# Patient Record
Sex: MALE | Race: WHITE | NOT HISPANIC OR LATINO | Employment: FULL TIME | URBAN - METROPOLITAN AREA
[De-identification: names, ages, dates, MRNs, and addresses within clinical notes are randomized per-mention and may not be internally consistent; named-entity substitution may affect disease eponyms.]

---

## 2017-01-16 ENCOUNTER — OFFICE VISIT (OUTPATIENT)
Dept: FAMILY MEDICINE | Facility: CLINIC | Age: 27
End: 2017-01-16
Payer: COMMERCIAL

## 2017-01-16 VITALS
RESPIRATION RATE: 14 BRPM | SYSTOLIC BLOOD PRESSURE: 137 MMHG | HEART RATE: 70 BPM | TEMPERATURE: 98.2 F | DIASTOLIC BLOOD PRESSURE: 75 MMHG

## 2017-01-16 DIAGNOSIS — Z20.2 EXPOSURE TO GENITAL HERPES: Primary | ICD-10-CM

## 2017-01-16 PROCEDURE — 87529 HSV DNA AMP PROBE: CPT | Performed by: NURSE PRACTITIONER

## 2017-01-16 PROCEDURE — 99213 OFFICE O/P EST LOW 20 MIN: CPT | Performed by: NURSE PRACTITIONER

## 2017-01-16 RX ORDER — VALACYCLOVIR HYDROCHLORIDE 1 G/1
1000 TABLET, FILM COATED ORAL 2 TIMES DAILY
Qty: 20 TABLET | Refills: 0 | Status: SHIPPED | OUTPATIENT
Start: 2017-01-16 | End: 2017-01-30

## 2017-01-16 NOTE — NURSING NOTE
"Chief Complaint   Patient presents with     STD       Initial /75 mmHg  Pulse 70  Temp(Src) 98.2  F (36.8  C) (Tympanic)  Resp 14 Estimated body mass index is 27.87 kg/(m^2) as calculated from the following:    Height as of 3/3/16: 6' 4.5\" (1.943 m).    Weight as of 3/3/16: 232 lb (105.235 kg).  BP completed using cuff size: large  "

## 2017-01-16 NOTE — PATIENT INSTRUCTIONS
Take meds as directed.    Follow up with primary care provider in 1 week if needed.  Herpes: Caring for Sores  Good hygiene matters when you have herpes. Take care of your sores to speed healing. Neglected sores can lead to other infections.     Warm baths can ease itching and burning caused by sores.   To ease your symptoms    Take any medications as directed.    Take acetaminophen or ibuprofen for pain.    Take warm or cool baths to relieve itching of sores. And don t share towels when you have a sore.    Urinate in a tub of warm water to prevent burning. This works for women with genital herpes.    Don t wear tight clothes or nylon underwear. They can trap moisture, cause chafing, and prevent sores from healing.  To speed healing    Wash the sores with mild soap and water. And wash your hands after you touch a sore.    Dry the affected area completely.    Don t bandage sores. The dry air helps them heal.    Avoid ointments unless they are prescribed. They retain moisture and may cause other infections.    Don t pick at the sores. This can slow healing.    Don t touch your eyes when you have a sore. The virus may travel from your fingertips to your eyes.  Resources  [employer] STD Hotline  756.602.1353  www.ashastd.org  Centers for Disease Control and Prevention  813.902.2642  www.cdc.gov/std     5792-2828 [employer] [address] [place], [place] [id number]. All rights reserved. This information is not intended as a substitute for professional medical care. Always follow your healthcare professional's instructions.        Genital Herpes    Genital herpes is a common sexually transmitted disease (STD). It is caused by the herpes simplex virus (HSV). One out of five teens and adults carry the herpes virus. During an outbreak, it causes small blisters that break open, leaving small, painful sores in the genital area. Eventually, scabs form and the sores heal. In women, these  show up most often on the skin just outside the vaginal opening. They can occur on the buttocks, anus, or cervix. In men, the sores are usually on the tip, sides, or base of the penis. They also occur on the scrotum, buttocks, or thighs.  The first outbreak begins within 2 to 3 weeks after exposure to an infected sexual partner. It may last 1 to 3 weeks. It may cause headache, muscle ache, and fevers. The first outbreak is usually the worst. Because the virus remains in the body even after the sores heal, most people will have more outbreaks. The frequency of outbreaks is different for each person. Some people will never have another outbreak. Others will have several episodes a year. Later outbreaks are usually shorter, milder, and less painful. For many, the number of outbreaks tends to decrease over time. Various factors may trigger an outbreak. These include:    Emotional stress    Menstruation    Presence of another illness (cold, flu, or fever from any cause)    Overexertion and fatigue    Weakened immune system  Home care    It is very important that you do not have sexual relations until all the herpes sores have healed completely.    Wash the affected area gently with mild soap and water. Wash your hands after touching the affected area.    You may use over-the-counter pain medicine unless another pain medicine was prescribed. (Note: If you have chronic liver or kidney disease or have ever had a stomach ulcer or gastrointestinal bleeding, talk with your healthcare provider before using these medicines. Also talk to your provider if you are taking medicine to prevent blood clots.) Aspirin should never be given to anyone younger than 18 years of age who is ill with a viral infection or fever. It may cause severe liver or brain damage.    Your healthcare provider may prescribe antiviral medicine during the first outbreak. This will help the sores heal faster. Antiviral medicine may also be prescribed so that  you have it ready to take at the first sign of another outbreak. This will help the symptoms go away sooner. For people with frequent outbreaks, daily therapy may be prescribed. This will help reduce the frequency of attacks. Daily therapy may also reduce risk of spread of herpes to your sexual partner. Discuss the risks and benefits of daily therapy with your healthcare provider.    If you are a woman who is pregnant now or may become pregnant in the future, let your healthcare provider know that you have had herpes. This may affect the way your baby is delivered.  Preventing spread to others  The virus is spread by sexual contact with someone who has the herpes virus. The risk of spread is highest when the sores are present. However, there is a chance of spreading the virus even when sores are not visible. Inform future sexual partners that you have herpes and that they may become infected. To reduce the risk of passing the virus to a partner who has never had herpes, avoid sexual relations at the first sign of an outbreak and until the sores are fully healed. Latex barriers, such as condoms, reduce the risk of spread between outbreaks if the infected site is covered, but they do not guarantee protection.  Follow-up care  Follow up with your healthcare provider, or as advised.  People who have just learned that they have herpes may feel upset. Getting the facts about herpes can help you feel more in control. Follow up with your healthcare provider or the public health department for complete STD screening, including HIV testing. For more information about herpes, visit the Centers for Disease Control (CDC) Genital Herpes website at: www.cdc.gov/std/Herpes/default.htm.  When to seek medical advice  Call your healthcare provider right away if any of these occur:    Inability to urinate due to pain    Swelling or increasing redness in the genital area    Unusual drowsiness, weakness, or confusion    Headache or stiff  neck    Discharge from the vagina or penis    Increasing back or abdominal pain    Rash or joint pain    5624-5638 [employer]. [address] [place] [address]. All rights reserved. This information is not intended as a substitute for professional medical care. Always follow your healthcare professional's instructions.        The Herpes Virus  Herpes is a virus that can cause sores on the skin. There are two types of the virus. Depending on how you come in contact with the virus, either type can cause outbreaks near the mouth or on the sex organs.  Understanding the herpes virus  Herpes reproduces only when it is inside the body. It does so by tricking a healthy cell into producing copies of the herpes virus. Each copy can infect nearby cells. But, before too long, the body s defenses rally to stop the attack. The immune system forces the virus to retreat. For some people, an acute outbreak never happens again. For others, menstruation, illness, poor diet, fatigue, or stress makes outbreaks more likely.    How the herpes virus attacks    The herpes virus enters the body through a small break in the skin. The virus can also enter by direct contact with mucous membranes, such as those of the lips, vagina, or anus.    Inside the body, the herpes virus binds to a special site on a skin cell. Then part of the virus moves into the cell.    Inside the skin cell, the virus releases a set of instructions. These commands cause the cell to begin making copies of the herpes virus.    Herpes blisters appear on the skin. Herpes blisters may also appear on mucous membranes lining the mouth, vagina, or anus.    7870-7157 [employer]. [address] [place] [address]. All rights reserved. This information is not intended as a substitute for professional medical care. Always follow your healthcare professional's instructions.        Living with Herpes  To speed healing, take care of open  herpes sores. To reduce outbreaks, take care of your health. And to keep from infecting others, learn how to avoid spreading the virus.     To ease symptoms    Start episodic treatment at the first sign of symptoms, such as itching or tingling.    Take ibuprofen or acetaminophen to limit any pain.    Sit in a warm or cool bath or use a moist compress to lessen the itching of sores. For some women, genital outbreaks cause burning during urination. In such cases, urinating in a tub of warm water helps reduce burning.    Wear white cotton underwear and loose clothing during outbreaks. Don t wear nylon underwear or tight clothes. They can prevent sores from healing.     To speed healing    Wash sores with mild soap and water. Pat the sores completely dry.    Always wash your hands after touching a sore.    Don t bandage sores. Air helps them heal.    Avoid using any ointment unless it is prescribed. Applying the wrong jelly or cream may hold in moisture and slow healing.    Don t pick at the sores. This can slow healing, and might cause a sore to become infected.    If you wear contacts, wash your hands well before putting them in.     To reduce outbreaks    Eat a balanced diet. Your health care provider may suggest taking supplements. These help ensure that you get all the nutrients you need.    Get plenty of sleep. This helps your immune system work its best.    Limit stress and tension. Both can weaken the body s defenses.    Limit exposure to sun, wind, and extreme heat or cold. Wear sunscreen and lip balm to help prevent outbreaks.     To protect others    Tell your current sex partner and any future partners that you have herpes. If you don t know what to say, ask your health care provider for help.    Use a latex condom that covers the affected areas each time you have sex. This reduces the risk of passing herpes to your partner.    Avoid kissing when you have an oral sore.    Do not have intercourse when genital  sores are present. Also keep in mind, herpes can be passed during oral sex and with anal contact.    Don t share towels, toothbrushes, lip balm, or lipstick when you have a sore.    Some evidence supports taking daily antiviral medications to help reduce the likelihood of transmission to your partner.    2219-7807 [employer]. [address] [place] [address], [place] [address]. All rights reserved. This information is not intended as a substitute for professional medical care. Always follow your healthcare professional's instructions.

## 2017-01-16 NOTE — PROGRESS NOTES
SUBJECTIVE:                                                    Rafy Zepeda is a 26 year old male who presents to clinic today for the following health issues:      STD Screen   No discharge or painful urination     Has several sores on penis;  2 weeks   -body aches, decreased appetite, loose stools -went away last week     Girlfriend went and in day to get tested for herpes.            Problem list and histories reviewed & adjusted, as indicated.  Additional history: as documented    Patient Active Problem List   Diagnosis     CARDIOVASCULAR SCREENING; LDL GOAL LESS THAN 160     Generalized anxiety disorder     ADD (attention deficit disorder)     Head injury, closed, with LOC of unknown duration (H)     Past Surgical History   Procedure Laterality Date     No history of surgery         Social History   Substance Use Topics     Smoking status: Current Some Day Smoker -- 0.50 packs/day     Types: Cigarettes     Smokeless tobacco: Former User     Types: Chew     Quit date: 11/01/2011      Comment: 1 pack per week     Alcohol Use: Yes     Family History   Problem Relation Age of Onset     DIABETES Maternal Grandmother      Hypertension Maternal Grandmother      CANCER Paternal Grandmother      lung     Hypertension Paternal Grandfather      Anxiety Disorder Mother      Anxiety Disorder Sister          Current Outpatient Prescriptions   Medication Sig Dispense Refill     CLONIDINE HCL PO Take 0.2 mg by mouth       valACYclovir (VALTREX) 1000 mg tablet Take 1 tablet (1,000 mg) by mouth 2 times daily 20 tablet 0     venlafaxine (EFFEXOR-XR) 37.5 MG 24 hr capsule Take by mouth 2 times daily       oxyCODONE (ROXICODONE) 10 MG immediate release tablet Take one pill every 4-6 hours as needed for pain. 30 tablet 0     oxyCODONE (ROXICODONE) 5 MG immediate release tablet Take 1 tablet (5 mg) by mouth every 3 hours as needed for moderate to severe pain 40 tablet 0     acetaminophen (TYLENOL) 500 MG tablet Take 2 tablets  (1,000 mg) by mouth 3 times daily 60 tablet 0     cephALEXin (KEFLEX) 500 MG capsule Take 1 capsule (500 mg) by mouth 4 times daily 24 capsule 0     bacitracin ointment Apply topically 2 times daily 28 g 0     silver sulfADIAZINE (SILVADENE) 1 % cream Apply topically 2 times daily 25 g 0     amphetamine-dextroamphetamine (ADDERALL) 20 MG tablet Take 1 tablet (20 mg) by mouth 2 times daily 60 tablet 0     No Known Allergies  Problem list, Medication list, Allergies, and Medical/Social/Surgical histories reviewed in [facility] and updated as appropriate.    ROS:  Constitutional, HEENT, cardiovascular, pulmonary, GI, , musculoskeletal, neuro, skin, endocrine and psych systems are negative, except as otherwise noted.    OBJECTIVE:                                                    /75 mmHg  Pulse 70  Temp(Src) 98.2  F (36.8  C) (Tympanic)  Resp 14  There is no weight on file to calculate BMI.  GENERAL: healthy, alert and no distress  EYES: Eyes grossly normal to inspection,  conjunctivae and sclerae normal  HENT: ear canals and TM's normal, nose and mouth without ulcers or lesions  :  On underside of penis just at base of glans, there is a 1.5 cm open ulceration that is starting to heal but still open and tender. Culture taken for HSV. Pt tolerated well.    Diagnostic Test Results:  No results found for this or any previous visit (from the past 24 hour(s)).     ASSESSMENT/PLAN:                                                    Pt is very upset that he possibly got genital herpes from a girl he briefly dated who he states knew she had herpes. He feels like his life has changed as he then gave it to his girlfriend he has gotten back with and who is at visit today with him. She was seen this morning at [facility] and told she had same problem and was placed on Valtrex.  Much reassurance given as they have fears. He is refusing other STD testing that he originally wanted to have done today because he is too angry at  this time. Current girlfriend is having all of them done at [facility] and she states they will follow up if any of hers are positive. They feel dirty and are having trouble dealing with this. Referral given to Infectious Disease and [place] Department of Health for any further questions needing answers.  Problem List Items Addressed This Visit     None      Visit Diagnoses     Exposure to genital herpes    -  Primary     Relevant Medications     valACYclovir (VALTREX) 1000 mg tablet     Other Relevant Orders     HSV 1 and 2 DNA by PCR (Completed)                Patient Instructions     Take meds as directed.    Follow up with primary care provider in 1 week if needed.  Herpes: Caring for Sores  Good hygiene matters when you have herpes. Take care of your sores to speed healing. Neglected sores can lead to other infections.     Warm baths can ease itching and burning caused by sores.   To ease your symptoms    Take any medications as directed.    Take acetaminophen or ibuprofen for pain.    Take warm or cool baths to relieve itching of sores. And don t share towels when you have a sore.    Urinate in a tub of warm water to prevent burning. This works for women with genital herpes.    Don t wear tight clothes or nylon underwear. They can trap moisture, cause chafing, and prevent sores from healing.  To speed healing    Wash the sores with mild soap and water. And wash your hands after you touch a sore.    Dry the affected area completely.    Don t bandage sores. The dry air helps them heal.    Avoid ointments unless they are prescribed. They retain moisture and may cause other infections.    Don t pick at the sores. This can slow healing.    Don t touch your eyes when you have a sore. The virus may travel from your fingertips to your eyes.  Resources  American [employer] STD Hotline  370.257.7238  www.ashastd.org  Centers for Disease Control and Prevention  621.447.1328  www.cdc.gov/std     7493-7417 The  [employer]. [address] [place] [address]. All rights reserved. This information is not intended as a substitute for professional medical care. Always follow your healthcare professional's instructions.        Genital Herpes    Genital herpes is a common sexually transmitted disease (STD). It is caused by the herpes simplex virus (HSV). One out of five teens and adults carry the herpes virus. During an outbreak, it causes small blisters that break open, leaving small, painful sores in the genital area. Eventually, scabs form and the sores heal. In women, these show up most often on the skin just outside the vaginal opening. They can occur on the buttocks, anus, or cervix. In men, the sores are usually on the tip, sides, or base of the penis. They also occur on the scrotum, buttocks, or thighs.  The first outbreak begins within 2 to 3 weeks after exposure to an infected sexual partner. It may last 1 to 3 weeks. It may cause headache, muscle ache, and fevers. The first outbreak is usually the worst. Because the virus remains in the body even after the sores heal, most people will have more outbreaks. The frequency of outbreaks is different for each person. Some people will never have another outbreak. Others will have several episodes a year. Later outbreaks are usually shorter, milder, and less painful. For many, the number of outbreaks tends to decrease over time. Various factors may trigger an outbreak. These include:    Emotional stress    Menstruation    Presence of another illness (cold, flu, or fever from any cause)    Overexertion and fatigue    Weakened immune system  Home care    It is very important that you do not have sexual relations until all the herpes sores have healed completely.    Wash the affected area gently with mild soap and water. Wash your hands after touching the affected area.    You may use over-the-counter pain medicine unless another pain medicine was prescribed.  (Note: If you have chronic liver or kidney disease or have ever had a stomach ulcer or gastrointestinal bleeding, talk with your healthcare provider before using these medicines. Also talk to your provider if you are taking medicine to prevent blood clots.) Aspirin should never be given to anyone younger than 18 years of age who is ill with a viral infection or fever. It may cause severe liver or brain damage.    Your healthcare provider may prescribe antiviral medicine during the first outbreak. This will help the sores heal faster. Antiviral medicine may also be prescribed so that you have it ready to take at the first sign of another outbreak. This will help the symptoms go away sooner. For people with frequent outbreaks, daily therapy may be prescribed. This will help reduce the frequency of attacks. Daily therapy may also reduce risk of spread of herpes to your sexual partner. Discuss the risks and benefits of daily therapy with your healthcare provider.    If you are a woman who is pregnant now or may become pregnant in the future, let your healthcare provider know that you have had herpes. This may affect the way your baby is delivered.  Preventing spread to others  The virus is spread by sexual contact with someone who has the herpes virus. The risk of spread is highest when the sores are present. However, there is a chance of spreading the virus even when sores are not visible. Inform future sexual partners that you have herpes and that they may become infected. To reduce the risk of passing the virus to a partner who has never had herpes, avoid sexual relations at the first sign of an outbreak and until the sores are fully healed. Latex barriers, such as condoms, reduce the risk of spread between outbreaks if the infected site is covered, but they do not guarantee protection.  Follow-up care  Follow up with your healthcare provider, or as advised.  People who have just learned that they have herpes may  feel upset. Getting the facts about herpes can help you feel more in control. Follow up with your healthcare provider or the public health department for complete STD screening, including HIV testing. For more information about herpes, visit the Centers for Disease Control (CDC) Genital Herpes website at: www.cdc.gov/std/Herpes/default.htm.  When to seek medical advice  Call your healthcare provider right away if any of these occur:    Inability to urinate due to pain    Swelling or increasing redness in the genital area    Unusual drowsiness, weakness, or confusion    Headache or stiff neck    Discharge from the vagina or penis    Increasing back or abdominal pain    Rash or joint pain    5158-5247 [employer]. [address] [place] [address]. All rights reserved. This information is not intended as a substitute for professional medical care. Always follow your healthcare professional's instructions.        The Herpes Virus  Herpes is a virus that can cause sores on the skin. There are two types of the virus. Depending on how you come in contact with the virus, either type can cause outbreaks near the mouth or on the sex organs.  Understanding the herpes virus  Herpes reproduces only when it is inside the body. It does so by tricking a healthy cell into producing copies of the herpes virus. Each copy can infect nearby cells. But, before too long, the body s defenses rally to stop the attack. The immune system forces the virus to retreat. For some people, an acute outbreak never happens again. For others, menstruation, illness, poor diet, fatigue, or stress makes outbreaks more likely.    How the herpes virus attacks    The herpes virus enters the body through a small break in the skin. The virus can also enter by direct contact with mucous membranes, such as those of the lips, vagina, or anus.    Inside the body, the herpes virus binds to a special site on a skin cell. Then part of the virus  moves into the cell.    Inside the skin cell, the virus releases a set of instructions. These commands cause the cell to begin making copies of the herpes virus.    Herpes blisters appear on the skin. Herpes blisters may also appear on mucous membranes lining the mouth, vagina, or anus.    7176-1651 The [employer] [address] [place] [address], [place] [address]. All rights reserved. This information is not intended as a substitute for professional medical care. Always follow your healthcare professional's instructions.        Living with Herpes  To speed healing, take care of open herpes sores. To reduce outbreaks, take care of your health. And to keep from infecting others, learn how to avoid spreading the virus.     To ease symptoms    Start episodic treatment at the first sign of symptoms, such as itching or tingling.    Take ibuprofen or acetaminophen to limit any pain.    Sit in a warm or cool bath or use a moist compress to lessen the itching of sores. For some women, genital outbreaks cause burning during urination. In such cases, urinating in a tub of warm water helps reduce burning.    Wear white cotton underwear and loose clothing during outbreaks. Don t wear nylon underwear or tight clothes. They can prevent sores from healing.     To speed healing    Wash sores with mild soap and water. Pat the sores completely dry.    Always wash your hands after touching a sore.    Don t bandage sores. Air helps them heal.    Avoid using any ointment unless it is prescribed. Applying the wrong jelly or cream may hold in moisture and slow healing.    Don t pick at the sores. This can slow healing, and might cause a sore to become infected.    If you wear contacts, wash your hands well before putting them in.     To reduce outbreaks    Eat a balanced diet. Your health care provider may suggest taking supplements. These help ensure that you get all the nutrients you need.    Get plenty of sleep. This helps your  immune system work its best.    Limit stress and tension. Both can weaken the body s defenses.    Limit exposure to sun, wind, and extreme heat or cold. Wear sunscreen and lip balm to help prevent outbreaks.     To protect others    Tell your current sex partner and any future partners that you have herpes. If you don t know what to say, ask your health care provider for help.    Use a latex condom that covers the affected areas each time you have sex. This reduces the risk of passing herpes to your partner.    Avoid kissing when you have an oral sore.    Do not have intercourse when genital sores are present. Also keep in mind, herpes can be passed during oral sex and with anal contact.    Don t share towels, toothbrushes, lip balm, or lipstick when you have a sore.    Some evidence supports taking daily antiviral medications to help reduce the likelihood of transmission to your partner.    5708-1613 [employer]. [address] [place] [address], [place] [address]. All rights reserved. This information is not intended as a substitute for professional medical care. Always follow your healthcare professional's instructions.              MARGUERITE Rogel [facility]

## 2017-01-16 NOTE — MR AVS SNAPSHOT
After Visit Summary   1/16/2017    Rafy Zepeda    MRN: [id number]           Patient Information     Date Of Birth          1990        Visit Information        Provider Department      1/16/2017 1:00 PM Hortencia Santacruz APRN [facility]        Today's Diagnoses     Exposure to genital herpes    -  1       Care Instructions    Take meds as directed.    Follow up with primary care provider in 1 week if needed.  Herpes: Caring for Sores  Good hygiene matters when you have herpes. Take care of your sores to speed healing. Neglected sores can lead to other infections.     Warm baths can ease itching and burning caused by sores.   To ease your symptoms    Take any medications as directed.    Take acetaminophen or ibuprofen for pain.    Take warm or cool baths to relieve itching of sores. And don t share towels when you have a sore.    Urinate in a tub of warm water to prevent burning. This works for women with genital herpes.    Don t wear tight clothes or nylon underwear. They can trap moisture, cause chafing, and prevent sores from healing.  To speed healing    Wash the sores with mild soap and water. And wash your hands after you touch a sore.    Dry the affected area completely.    Don t bandage sores. The dry air helps them heal.    Avoid ointments unless they are prescribed. They retain moisture and may cause other infections.    Don t pick at the sores. This can slow healing.    Don t touch your eyes when you have a sore. The virus may travel from your fingertips to your eyes.  Resources  American Social Health Association STD Hotline  955.493.3373  www.ashastd.org  Centers for Disease Control and Prevention  348.107.6558  www.cdc.gov/std     4645-5783 [employer]. [address] [place] [address], [place] [address]. All rights reserved. This information is not intended as a substitute for professional medical care. Always follow your healthcare professional's  instructions.        Genital Herpes    Genital herpes is a common sexually transmitted disease (STD). It is caused by the herpes simplex virus (HSV). One out of five teens and adults carry the herpes virus. During an outbreak, it causes small blisters that break open, leaving small, painful sores in the genital area. Eventually, scabs form and the sores heal. In women, these show up most often on the skin just outside the vaginal opening. They can occur on the buttocks, anus, or cervix. In men, the sores are usually on the tip, sides, or base of the penis. They also occur on the scrotum, buttocks, or thighs.  The first outbreak begins within 2 to 3 weeks after exposure to an infected sexual partner. It may last 1 to 3 weeks. It may cause headache, muscle ache, and fevers. The first outbreak is usually the worst. Because the virus remains in the body even after the sores heal, most people will have more outbreaks. The frequency of outbreaks is different for each person. Some people will never have another outbreak. Others will have several episodes a year. Later outbreaks are usually shorter, milder, and less painful. For many, the number of outbreaks tends to decrease over time. Various factors may trigger an outbreak. These include:    Emotional stress    Menstruation    Presence of another illness (cold, flu, or fever from any cause)    Overexertion and fatigue    Weakened immune system  Home care    It is very important that you do not have sexual relations until all the herpes sores have healed completely.    Wash the affected area gently with mild soap and water. Wash your hands after touching the affected area.    You may use over-the-counter pain medicine unless another pain medicine was prescribed. (Note: If you have chronic liver or kidney disease or have ever had a stomach ulcer or gastrointestinal bleeding, talk with your healthcare provider before using these medicines. Also talk to your provider if you  are taking medicine to prevent blood clots.) Aspirin should never be given to anyone younger than 18 years of age who is ill with a viral infection or fever. It may cause severe liver or brain damage.    Your healthcare provider may prescribe antiviral medicine during the first outbreak. This will help the sores heal faster. Antiviral medicine may also be prescribed so that you have it ready to take at the first sign of another outbreak. This will help the symptoms go away sooner. For people with frequent outbreaks, daily therapy may be prescribed. This will help reduce the frequency of attacks. Daily therapy may also reduce risk of spread of herpes to your sexual partner. Discuss the risks and benefits of daily therapy with your healthcare provider.    If you are a woman who is pregnant now or may become pregnant in the future, let your healthcare provider know that you have had herpes. This may affect the way your baby is delivered.  Preventing spread to others  The virus is spread by sexual contact with someone who has the herpes virus. The risk of spread is highest when the sores are present. However, there is a chance of spreading the virus even when sores are not visible. Inform future sexual partners that you have herpes and that they may become infected. To reduce the risk of passing the virus to a partner who has never had herpes, avoid sexual relations at the first sign of an outbreak and until the sores are fully healed. Latex barriers, such as condoms, reduce the risk of spread between outbreaks if the infected site is covered, but they do not guarantee protection.  Follow-up care  Follow up with your healthcare provider, or as advised.  People who have just learned that they have herpes may feel upset. Getting the facts about herpes can help you feel more in control. Follow up with your healthcare provider or the public health department for complete STD screening, including HIV testing. For more  information about herpes, visit the Centers for Disease Control (CDC) Genital Herpes website at: www.cdc.gov/std/Herpes/default.htm.  When to seek medical advice  Call your healthcare provider right away if any of these occur:    Inability to urinate due to pain    Swelling or increasing redness in the genital area    Unusual drowsiness, weakness, or confusion    Headache or stiff neck    Discharge from the vagina or penis    Increasing back or abdominal pain    Rash or joint pain    6031-2547 [employer]. [address] [place] [address]. All rights reserved. This information is not intended as a substitute for professional medical care. Always follow your healthcare professional's instructions.        The Herpes Virus  Herpes is a virus that can cause sores on the skin. There are two types of the virus. Depending on how you come in contact with the virus, either type can cause outbreaks near the mouth or on the sex organs.  Understanding the herpes virus  Herpes reproduces only when it is inside the body. It does so by tricking a healthy cell into producing copies of the herpes virus. Each copy can infect nearby cells. But, before too long, the body s defenses rally to stop the attack. The immune system forces the virus to retreat. For some people, an acute outbreak never happens again. For others, menstruation, illness, poor diet, fatigue, or stress makes outbreaks more likely.    How the herpes virus attacks    The herpes virus enters the body through a small break in the skin. The virus can also enter by direct contact with mucous membranes, such as those of the lips, vagina, or anus.    Inside the body, the herpes virus binds to a special site on a skin cell. Then part of the virus moves into the cell.    Inside the skin cell, the virus releases a set of instructions. These commands cause the cell to begin making copies of the herpes virus.    Herpes blisters appear on the skin. Herpes  blisters may also appear on mucous membranes lining the mouth, vagina, or anus.    4437-3285 [employer]. [address] [place] [address], [place] [address]. All rights reserved. This information is not intended as a substitute for professional medical care. Always follow your healthcare professional's instructions.        Living with Herpes  To speed healing, take care of open herpes sores. To reduce outbreaks, take care of your health. And to keep from infecting others, learn how to avoid spreading the virus.     To ease symptoms    Start episodic treatment at the first sign of symptoms, such as itching or tingling.    Take ibuprofen or acetaminophen to limit any pain.    Sit in a warm or cool bath or use a moist compress to lessen the itching of sores. For some women, genital outbreaks cause burning during urination. In such cases, urinating in a tub of warm water helps reduce burning.    Wear white cotton underwear and loose clothing during outbreaks. Don t wear nylon underwear or tight clothes. They can prevent sores from healing.     To speed healing    Wash sores with mild soap and water. Pat the sores completely dry.    Always wash your hands after touching a sore.    Don t bandage sores. Air helps them heal.    Avoid using any ointment unless it is prescribed. Applying the wrong jelly or cream may hold in moisture and slow healing.    Don t pick at the sores. This can slow healing, and might cause a sore to become infected.    If you wear contacts, wash your hands well before putting them in.     To reduce outbreaks    Eat a balanced diet. Your health care provider may suggest taking supplements. These help ensure that you get all the nutrients you need.    Get plenty of sleep. This helps your immune system work its best.    Limit stress and tension. Both can weaken the body s defenses.    Limit exposure to sun, wind, and extreme heat or cold. Wear sunscreen and lip balm to help prevent  outbreaks.     To protect others    Tell your current sex partner and any future partners that you have herpes. If you don t know what to say, ask your health care provider for help.    Use a latex condom that covers the affected areas each time you have sex. This reduces the risk of passing herpes to your partner.    Avoid kissing when you have an oral sore.    Do not have intercourse when genital sores are present. Also keep in mind, herpes can be passed during oral sex and with anal contact.    Don t share towels, toothbrushes, lip balm, or lipstick when you have a sore.    Some evidence supports taking daily antiviral medications to help reduce the likelihood of transmission to your partner.    8966-3731 The "[employer]". [address] [place] [address], [place] [address]. All rights reserved. This information is not intended as a substitute for professional medical care. Always follow your healthcare professional's instructions.              Follow-ups after your visit        Follow-up notes from your care team     See patient instructions section of the AVS Return in about 1 week (around 1/23/2017), or if symptoms worsen or fail to improve, for Follow up with your primary care provider.      Who to contact     If you have questions or need follow up information about today's clinic visit or your schedule please contact [facility] directly at [phone].  Normal or non-critical lab and imaging results will be communicated to you by MyChart, letter or phone within 4 business days after the clinic has received the results. If you do not hear from us within 7 days, please contact the clinic through MyChart or phone. If you have a critical or abnormal lab result, we will notify you by phone as soon as possible.  Submit refill requests through [email or web address] or call your pharmacy and they will forward the refill request to us. Please allow 3 business days for your refill to be completed.           "Additional Information About Your Visit        MyChart Information     [facility] lets you send messages to your doctor, view your test results, renew your prescriptions, schedule appointments and more. To sign up, go to [email or web address] . Click on \"Log in\" on the left side of the screen, which will take you to the Welcome page. Then click on \"Sign up Now\" on the right side of the page.     You will be asked to enter the access code listed below, as well as some personal information. Please follow the directions to create your username and password.     Your access code is: [email or web address]  Expires: 2017  1:58 PM     Your access code will  in 90 days. If you need help or a new code, please call your [facility] clinic or [phone].        Care EveryWhere ID     This is your Care EveryWhere ID. This could be used by other organizations to access your [facility] medical records  [id number]        Your Vitals Were     Pulse Temperature Respirations             70 98.2  F (36.8  C) (Tympanic) 14          Blood Pressure from Last 3 Encounters:   17 137/75   16 140/80   16 131/63    Weight from Last 3 Encounters:   16 232 lb (105.235 kg)   16 220 lb 12.8 oz (100.154 kg)   11/03/15 230 lb (104.327 kg)              We Performed the Following     HSV 1 and 2 DNA by PCR          Today's Medication Changes          These changes are accurate as of: 17  1:58 PM.  If you have any questions, ask your nurse or doctor.               Start taking these medicines.        Dose/Directions    valACYclovir 1000 mg tablet   Commonly known as:  VALTREX   Used for:  Exposure to genital herpes   Started by:  Hortencia Santacruz APRN CNP        Dose:  1000 mg   Take 1 tablet (1,000 mg) by mouth 2 times daily   Quantity:  20 tablet   Refills:  0            Where to get your medicines      These medications were sent to [facility] [place] [address] " [address] [place] [address]     Phone:  [phone]    - valACYclovir 1000 mg tablet             Primary Care Provider Office Phone # Fax #    Matt Pathak [phone]       [facility] [address] [place] [address]        Thank you!     Thank you for choosing [facility]  for your care. Our goal is always to provide you with excellent care. Hearing back from our patients is one way we can continue to improve our services. Please take a few minutes to complete the written survey that you may receive in the mail after your visit with us. Thank you!             Your Updated Medication List - Protect others around you: Learn how to safely use, store and throw away your medicines at www.disposemymeds.org.          This list is accurate as of: 1/16/17  1:58 PM.  Always use your most recent med list.                   Brand Name Dispense Instructions for use    acetaminophen 500 MG tablet    TYLENOL    60 tablet    Take 2 tablets (1,000 mg) by mouth 3 times daily       amphetamine-dextroamphetamine 20 MG per tablet    ADDERALL    60 tablet    Take 1 tablet (20 mg) by mouth 2 times daily       bacitracin ointment     28 g    Apply topically 2 times daily       cephALEXin 500 MG capsule    KEFLEX    24 capsule    Take 1 capsule (500 mg) by mouth 4 times daily       CLONIDINE HCL PO      Take 0.2 mg by mouth       * oxyCODONE 5 MG IR tablet    ROXICODONE    40 tablet    Take 1 tablet (5 mg) by mouth every 3 hours as needed for moderate to severe pain       * oxyCODONE 10 MG IR tablet    ROXICODONE    30 tablet    Take one pill every 4-6 hours as needed for pain.       silver sulfADIAZINE 1 % cream    SILVADENE    25 g    Apply topically 2 times daily       valACYclovir 1000 mg tablet    VALTREX    20 tablet    Take 1 tablet (1,000 mg) by mouth 2 times daily       venlafaxine 37.5 MG 24 hr capsule    EFFEXOR-XR     Take by mouth 2 times daily       * Notice:   This list has 2 medication(s) that are the same as other medications prescribed for you. Read the directions carefully, and ask your doctor or other care provider to review them with you.

## 2017-01-18 LAB
HSV1 DNA SPEC QL NAA+PROBE: POSITIVE
HSV2 DNA SPEC QL NAA+PROBE: ABNORMAL
SPECIMEN SOURCE: ABNORMAL

## 2017-01-27 ENCOUNTER — TELEPHONE (OUTPATIENT)
Dept: FAMILY MEDICINE | Facility: CLINIC | Age: 27
End: 2017-01-27

## 2017-01-27 NOTE — TELEPHONE ENCOUNTER
Please call.    I'm not sure this is an allergic reaction but I would suggest skipping the remaining pills.   If antiviral needed in the future, he could try this again or go for alternative like acyclovir.  JOEL ROGERS MD

## 2017-01-27 NOTE — TELEPHONE ENCOUNTER
Reason for call:  Patient reporting a symptom    Symptom or request: Pt says he is having side effects from Valtrex. He has one dose left. Pt sates he has stuffy nose, Fever, dizziness, eyes blurring and full body pains. He says he looked it up and he has almost every side effect listed for Valtex    Duration (how long have symptoms been present): Since last Sunday      Phone Number patient can be reached at:  Home number on file [phone] (home)    Best Time:  anytime    Can we leave a detailed message on this number:  YES    Call taken on 1/27/2017 at 10:49 AM by Nohemy Fontenot

## 2017-01-27 NOTE — TELEPHONE ENCOUNTER
Pt called. States on Sunday started feeling ill. States he has body aches, headache, fever (has not checked temp)/chills, poor appetite, runny nose. Pt states he is concerned it is related to the valtrex, has 1 pill left he started on 1-16, symptoms began 1-22. Pt wanting to know if Dr Pathak think this is an allergy and if he should finish script. Tara Conner RN

## 2017-01-30 ENCOUNTER — OFFICE VISIT (OUTPATIENT)
Dept: FAMILY MEDICINE | Facility: CLINIC | Age: 27
End: 2017-01-30
Payer: COMMERCIAL

## 2017-01-30 VITALS
BODY MASS INDEX: 29.14 KG/M2 | HEART RATE: 82 BPM | TEMPERATURE: 98.3 F | WEIGHT: 246.8 LBS | OXYGEN SATURATION: 98 % | SYSTOLIC BLOOD PRESSURE: 134 MMHG | DIASTOLIC BLOOD PRESSURE: 80 MMHG | HEIGHT: 77 IN | RESPIRATION RATE: 24 BRPM

## 2017-01-30 DIAGNOSIS — R11.0 NAUSEA: Primary | ICD-10-CM

## 2017-01-30 LAB
BASOPHILS # BLD AUTO: 0 10E9/L (ref 0–0.2)
BASOPHILS NFR BLD AUTO: 0.2 %
DIFFERENTIAL METHOD BLD: NORMAL
EOSINOPHIL # BLD AUTO: 0.1 10E9/L (ref 0–0.7)
EOSINOPHIL NFR BLD AUTO: 1.1 %
ERYTHROCYTE [DISTWIDTH] IN BLOOD BY AUTOMATED COUNT: 11.7 % (ref 10–15)
HCT VFR BLD AUTO: 45.7 % (ref 40–53)
HGB BLD-MCNC: 15.5 G/DL (ref 13.3–17.7)
LYMPHOCYTES # BLD AUTO: 2.2 10E9/L (ref 0.8–5.3)
LYMPHOCYTES NFR BLD AUTO: 35.1 %
MCH RBC QN AUTO: 30.6 PG (ref 26.5–33)
MCHC RBC AUTO-ENTMCNC: 33.9 G/DL (ref 31.5–36.5)
MCV RBC AUTO: 90 FL (ref 78–100)
MONOCYTES # BLD AUTO: 0.4 10E9/L (ref 0–1.3)
MONOCYTES NFR BLD AUTO: 6 %
NEUTROPHILS # BLD AUTO: 3.7 10E9/L (ref 1.6–8.3)
NEUTROPHILS NFR BLD AUTO: 57.6 %
PLATELET # BLD AUTO: 234 10E9/L (ref 150–450)
RBC # BLD AUTO: 5.07 10E12/L (ref 4.4–5.9)
WBC # BLD AUTO: 6.4 10E9/L (ref 4–11)

## 2017-01-30 PROCEDURE — 80053 COMPREHEN METABOLIC PANEL: CPT | Performed by: NURSE PRACTITIONER

## 2017-01-30 PROCEDURE — 99213 OFFICE O/P EST LOW 20 MIN: CPT | Performed by: NURSE PRACTITIONER

## 2017-01-30 PROCEDURE — 36415 COLL VENOUS BLD VENIPUNCTURE: CPT | Performed by: NURSE PRACTITIONER

## 2017-01-30 PROCEDURE — 85025 COMPLETE CBC W/AUTO DIFF WBC: CPT | Performed by: NURSE PRACTITIONER

## 2017-01-30 RX ORDER — VENLAFAXINE HYDROCHLORIDE 37.5 MG/1
37.5 CAPSULE, EXTENDED RELEASE ORAL
Qty: 30 CAPSULE | COMMUNITY
Start: 2017-01-30

## 2017-01-30 NOTE — PATIENT INSTRUCTIONS
"Increase rest and fluids.    Introduce foods gradually:    [place] Diet  Your healthcare provider may recommend a bland diet if you have an upset stomach. It consists of foods that are mild and easy to digest. It is better to eat small frequent meals rather than 3 large meals a day.    Beverages  OK: Fruit juices, non-caffeinated teas and coffee, non-carbonated vasquez  Avoid: Carbonated beverage, caffeinated tea and coffee, all alcoholic beverages  Bread  OK: Refined white, wheat or rye bread, matt or soda crackers, [place] toast, plain rolls, bagels  Avoid: Whole-grain bread  Cereal  OK: Refined cereals: cooked or ready to eat  Avoid: Whole-grain cereals and granola, or those containing bran, seeds or nuts  Desserts  OK: Peanut butter and all others except those to \"avoid\"  Avoid: Chocolate, cocoa, coconut, popcorn, nuts, seeds, jam, marmalade  Fruits  OK: Canned, cooked, frozen or fresh fruits without seeds or tough skin  Avoid: Olives, skin and seeds of fruit  Meats  OK: All fresh or preserved meat, fish and fowl  Avoid: Any that are prepared with those spices to \"avoid\"  Cheese and eggs  OK: Eggs, cottage cheese, cream cheese, other cheeses  Avoid: All cheeses made with those spices to \"avoid\"  Potatoes and pasta  OK: Potato, rice, macaroni, noodles, spaghetti  Avoid: None  Soups  OK: All soups without heavy seasoning  Avoid: Soups made with those spices to \"avoid\"  Vegetables  OK: Canned, cooked, fresh or frozen mildly flavored vegetables without seeds, skins or coarse fiber  Avoid: Vegetables prepared with those spices to \"avoid\"; skin and seeds of vegetables and those with coarse fiber  Spices  OK: Salt, lemon and lime juice, vinegar, all extracts, shayy, cinnamon, thyme, mace, allspice, paprika  Avoid: Chili powder, cloves, pepper, seed spices, garlic, gravy pickles, highly seasoned salad dressings    3393-1233 [employer]. [address] [place] [address], [place] [address]. All rights reserved. This " information is not intended as a substitute for professional medical care. Always follow your healthcare professional's instructions.

## 2017-01-30 NOTE — PROGRESS NOTES
"  SUBJECTIVE:                                                    Rafy Zepeda is a 26 year old male who presents to clinic today for the following health issues:      myalgia      Duration: 1 week    Description (location/character/radiation): body aches, weakness, fever, diarrhea, nausea, headache, unable to sleep, decreased appetitie    Intensity:  moderate    Accompanying signs and symptoms: 0    History (similar episodes/previous evaluation): this started on day 4 of Valtrex, patient was able to finish Valtrex, he is associating sx to medication    Precipitating or alleviating factors: patient requesting medication to \"boost\" immune system and pain medication for myalgia    Therapies tried and outcome: otc nsaids are some help     2 wks ago stomach flu which resolved.   Fever last week, Friday- 101.   Still weak, fatigued, having weird dreams. Has been taking Ibuprofen 3000 mg. A day  Diarrhea after starting valtrex. Goes on to say that diarrhea/ loose stools are not unusual for him  Drinking oj and water.  Nausea with eating last ninght.       Problem list and histories reviewed & adjusted, as indicated.  Additional history: as documented    Labs reviewed in [facility]  Problem list, Medication list, Allergies, and Medical/Social/Surgical histories reviewed in [facility] and updated as appropriate.    ROS:  Constitutional, HEENT, cardiovascular, pulmonary, gi and gu systems are negative, except as otherwise noted.    OBJECTIVE:                                                    /80 mmHg  Pulse 82  Temp(Src) 98.3  F (36.8  C) (Tympanic)  Resp 24  Ht 6' 5\" (1.956 m)  Wt 246 lb 12.8 oz (111.948 kg)  BMI 29.26 kg/m2  SpO2 98%  Body mass index is 29.26 kg/(m^2).  GENERAL: healthy, alert and no distress  NECK: no adenopathy, no asymmetry, masses, or scars and thyroid normal to palpation  RESP: lungs clear to auscultation - no rales, rhonchi or wheezes  CV: regular rate and rhythm, normal S1 S2, no S3 or S4, no " murmur, click or rub, no peripheral edema and peripheral pulses strong  ABDOMEN: soft, nontender, no hepatosplenomegaly, no masses and bowel sounds normal    Diagnostic Test Results:  Results for orders placed or performed in visit on 01/30/17   CBC with platelets differential   Result Value Ref Range    WBC 6.4 4.0 - 11.0 10e9/L    RBC Count 5.07 4.4 - 5.9 10e12/L    Hemoglobin 15.5 13.3 - 17.7 g/dL    Hematocrit 45.7 40.0 - 53.0 %    MCV 90 78 - 100 fl    MCH 30.6 26.5 - 33.0 pg    MCHC 33.9 31.5 - 36.5 g/dL    RDW 11.7 10.0 - 15.0 %    Platelet Count 234 150 - 450 10e9/L    Diff Method Automated Method     % Neutrophils 57.6 %    % Lymphocytes 35.1 %    % Monocytes 6.0 %    % Eosinophils 1.1 %    % Basophils 0.2 %    Absolute Neutrophil 3.7 1.6 - 8.3 10e9/L    Absolute Lymphocytes 2.2 0.8 - 5.3 10e9/L    Absolute Monocytes 0.4 0.0 - 1.3 10e9/L    Absolute Eosinophils 0.1 0.0 - 0.7 10e9/L    Absolute Basophils 0.0 0.0 - 0.2 10e9/L   Comprehensive metabolic panel (BMP + Alb, Alk Phos, ALT, AST, Total. Bili, TP)   Result Value Ref Range    Sodium 141 133 - 144 mmol/L    Potassium 4.4 3.4 - 5.3 mmol/L    Chloride 106 94 - 109 mmol/L    Carbon Dioxide 29 20 - 32 mmol/L    Anion Gap 6 3 - 14 mmol/L    Glucose 87 70 - 99 mg/dL    Urea Nitrogen 16 7 - 30 mg/dL    Creatinine 0.91 0.66 - 1.25 mg/dL    GFR Estimate >90  Non  GFR Calc   >60 mL/min/1.7m2    GFR Estimate If Black >90   GFR Calc   >60 mL/min/1.7m2    Calcium 9.3 8.5 - 10.1 mg/dL    Bilirubin Total 0.4 0.2 - 1.3 mg/dL    Albumin 4.3 3.4 - 5.0 g/dL    Protein Total 8.0 6.8 - 8.8 g/dL    Alkaline Phosphatase 81 40 - 150 U/L    ALT 23 0 - 70 U/L    AST 16 0 - 45 U/L        ASSESSMENT/PLAN:                                                        1. Nausea  Could be due to the Valtrex. He's been taking max dose of Ibu. Will check labs to rule out metabolic issues,  - CBC with platelets differential  - Comprehensive metabolic panel (BMP  "+ Alb, Alk Phos, ALT, AST, Total. Bili, TP)  - Counseled to limit Ibuprofen and to also try Tylenol for discomfort.    Patient Instructions   Increase rest and fluids.    Introduce foods gradually:    [place] Diet  Your healthcare provider may recommend a bland diet if you have an upset stomach. It consists of foods that are mild and easy to digest. It is better to eat small frequent meals rather than 3 large meals a day.    Beverages  OK: Fruit juices, non-caffeinated teas and coffee, non-carbonated vasquez  Avoid: Carbonated beverage, caffeinated tea and coffee, all alcoholic beverages  Bread  OK: Refined white, wheat or rye bread, matt or soda crackers, [place] toast, plain rolls, bagels  Avoid: Whole-grain bread  Cereal  OK: Refined cereals: cooked or ready to eat  Avoid: Whole-grain cereals and granola, or those containing bran, seeds or nuts  Desserts  OK: Peanut butter and all others except those to \"avoid\"  Avoid: Chocolate, cocoa, coconut, popcorn, nuts, seeds, jam, marmalade  Fruits  OK: Canned, cooked, frozen or fresh fruits without seeds or tough skin  Avoid: Olives, skin and seeds of fruit  Meats  OK: All fresh or preserved meat, fish and fowl  Avoid: Any that are prepared with those spices to \"avoid\"  Cheese and eggs  OK: Eggs, cottage cheese, cream cheese, other cheeses  Avoid: All cheeses made with those spices to \"avoid\"  Potatoes and pasta  OK: Potato, rice, macaroni, noodles, spaghetti  Avoid: None  Soups  OK: All soups without heavy seasoning  Avoid: Soups made with those spices to \"avoid\"  Vegetables  OK: Canned, cooked, fresh or frozen mildly flavored vegetables without seeds, skins or coarse fiber  Avoid: Vegetables prepared with those spices to \"avoid\"; skin and seeds of vegetables and those with coarse fiber  Spices  OK: Salt, lemon and lime juice, vinegar, all extracts, shayy, cinnamon, thyme, mace, allspice, paprika  Avoid: Chili powder, cloves, pepper, seed spices, garlic, gravy pickles, " highly seasoned salad dressings    7564-2597 [employer] [address] [place] [address], [place] [address]. All rights reserved. This information is not intended as a substitute for professional medical care. Always follow your healthcare professional's instructions.              Raquel Wadsworth NP, APRN [facility]

## 2017-01-30 NOTE — MR AVS SNAPSHOT
"              After Visit Summary   1/30/2017    Rafy Zepeda    MRN: [id number]           Patient Information     Date Of Birth          1990        Visit Information        Provider Department      1/30/2017 1:20 PM Raquel Wadsworth APRN [facility]        Today's Diagnoses     Nausea    -  1       Care Instructions    Increase rest and fluids.    Introduce foods gradually:    [place] Diet  Your healthcare provider may recommend a bland diet if you have an upset stomach. It consists of foods that are mild and easy to digest. It is better to eat small frequent meals rather than 3 large meals a day.    Beverages  OK: Fruit juices, non-caffeinated teas and coffee, non-carbonated vasquez  Avoid: Carbonated beverage, caffeinated tea and coffee, all alcoholic beverages  Bread  OK: Refined white, wheat or rye bread, matt or soda crackers, [place] toast, plain rolls, bagels  Avoid: Whole-grain bread  Cereal  OK: Refined cereals: cooked or ready to eat  Avoid: Whole-grain cereals and granola, or those containing bran, seeds or nuts  Desserts  OK: Peanut butter and all others except those to \"avoid\"  Avoid: Chocolate, cocoa, coconut, popcorn, nuts, seeds, jam, marmalade  Fruits  OK: Canned, cooked, frozen or fresh fruits without seeds or tough skin  Avoid: Olives, skin and seeds of fruit  Meats  OK: All fresh or preserved meat, fish and fowl  Avoid: Any that are prepared with those spices to \"avoid\"  Cheese and eggs  OK: Eggs, cottage cheese, cream cheese, other cheeses  Avoid: All cheeses made with those spices to \"avoid\"  Potatoes and pasta  OK: Potato, rice, macaroni, noodles, spaghetti  Avoid: None  Soups  OK: All soups without heavy seasoning  Avoid: Soups made with those spices to \"avoid\"  Vegetables  OK: Canned, cooked, fresh or frozen mildly flavored vegetables without seeds, skins or coarse fiber  Avoid: Vegetables prepared with those spices to \"avoid\"; skin and seeds of vegetables and " "those with coarse fiber  Spices  OK: Salt, lemon and lime juice, vinegar, all extracts, shayy, cinnamon, thyme, mace, allspice, paprika  Avoid: Chili powder, cloves, pepper, seed spices, garlic, gravy pickles, highly seasoned salad dressings    0687-4200 [employer]. [address] [place] [address]. All rights reserved. This information is not intended as a substitute for professional medical care. Always follow your healthcare professional's instructions.              Follow-ups after your visit        Who to contact     If you have questions or need follow up information about today's clinic visit or your schedule please contact [facility] directly at [phone].  Normal or non-critical lab and imaging results will be communicated to you by MyChart, letter or phone within 4 business days after the clinic has received the results. If you do not hear from us within 7 days, please contact the clinic through MyChart or phone. If you have a critical or abnormal lab result, we will notify you by phone as soon as possible.  Submit refill requests through Skeeble or call your pharmacy and they will forward the refill request to us. Please allow 3 business days for your refill to be completed.          Additional Information About Your Visit        CE [employer] Live Information     Skeeble lets you send messages to your doctor, view your test results, renew your prescriptions, schedule appointments and more. To sign up, go to [email or web address] . Click on \"Log in\" on the left side of the screen, which will take you to the Welcome page. Then click on \"Sign up Now\" on the right side of the page.     You will be asked to enter the access code listed below, as well as some personal information. Please follow the directions to create your username and password.     Your access code is: [email or web address]  Expires: 2017  1:58 PM     Your access code will  in 90 days. If you need help or a " "new code, please call your [facility] clinic or [phone].        Care EveryWhere ID     This is your Care EveryWhere ID. This could be used by other organizations to access your [facility] medical records  [id number]        Your Vitals Were     Pulse Temperature Respirations Height BMI (Body Mass Index) Pulse Oximetry    82 98.3  F (36.8  C) (Tympanic) 24 6' 5\" (1.956 m) 29.26 kg/m2 98%       Blood Pressure from Last 3 Encounters:   01/30/17 134/80   01/16/17 137/75   03/03/16 140/80    Weight from Last 3 Encounters:   01/30/17 246 lb 12.8 oz (111.948 kg)   03/03/16 232 lb (105.235 kg)   02/28/16 220 lb 12.8 oz (100.154 kg)              We Performed the Following     CBC with platelets differential     Comprehensive metabolic panel (BMP + Alb, Alk Phos, ALT, AST, Total. Bili, TP)          Today's Medication Changes          These changes are accurate as of: 1/30/17  2:04 PM.  If you have any questions, ask your nurse or doctor.               These medicines have changed or have updated prescriptions.        Dose/Directions    venlafaxine 37.5 MG 24 hr capsule   Commonly known as:  EFFEXOR-XR   This may have changed:  how much to take   Changed by:  Raquel Wadsworth APRN CNP        Dose:  37.5 mg   Take 1 capsule (37.5 mg) by mouth 2 times daily   Quantity:  30 capsule   Refills:  0         Stop taking these medicines if you haven't already. Please contact your care team if you have questions.     acetaminophen 500 MG tablet   Commonly known as:  TYLENOL   Stopped by:  Raquel Wadsworth APRN CNP           amphetamine-dextroamphetamine 20 MG per tablet   Commonly known as:  ADDERALL   Stopped by:  Raquel Wadsworth APRN CNP           bacitracin ointment   Stopped by:  Raquel Wadsworth APRN CNP           cephALEXin 500 MG capsule   Commonly known as:  KEFLEX   Stopped by:  Raquel Wadsworth APRN CNP           oxyCODONE 10 MG IR tablet   Commonly known as:  ROXICODONE   Stopped by:  Raquel Wadsworth" MARGUERITE Tidwell CNP           oxyCODONE 5 MG IR tablet   Commonly known as:  ROXICODONE   Stopped by:  Raquel Wadsworth APRN CNP           silver sulfADIAZINE 1 % cream   Commonly known as:  SILVADENE   Stopped by:  Raquel Wadsworth APRN CNP                    Primary Care Provider Office Phone # Fax #    Matt Pathak [phone]       [facility] [address] [place] [id number]        Thank you!     Thank you for choosing [facility]  for your care. Our goal is always to provide you with excellent care. Hearing back from our patients is one way we can continue to improve our services. Please take a few minutes to complete the written survey that you may receive in the mail after your visit with us. Thank you!             Your Updated Medication List - Protect others around you: Learn how to safely use, store and throw away your medicines at www.disposemymeds.org.          This list is accurate as of: 1/30/17  2:04 PM.  Always use your most recent med list.                   Brand Name Dispense Instructions for use    CLONIDINE HCL PO      Take 0.2 mg by mouth       venlafaxine 37.5 MG 24 hr capsule    EFFEXOR-XR    30 capsule    Take 1 capsule (37.5 mg) by mouth 2 times daily

## 2017-01-30 NOTE — NURSING NOTE
"Chief Complaint   Patient presents with     Musculoskeletal Problem       Initial /80 mmHg  Pulse 82  Temp(Src) 98.3  F (36.8  C) (Tympanic)  Resp 24  Ht 6' 5\" (1.956 m)  Wt 246 lb 12.8 oz (111.948 kg)  BMI 29.26 kg/m2  SpO2 98% Estimated body mass index is 29.26 kg/(m^2) as calculated from the following:    Height as of this encounter: 6' 5\" (1.956 m).    Weight as of this encounter: 246 lb 12.8 oz (111.948 kg).  BP completed using cuff size: regular  "

## 2017-01-31 LAB
ALBUMIN SERPL-MCNC: 4.3 G/DL (ref 3.4–5)
ALP SERPL-CCNC: 81 U/L (ref 40–150)
ALT SERPL W P-5'-P-CCNC: 23 U/L (ref 0–70)
ANION GAP SERPL CALCULATED.3IONS-SCNC: 6 MMOL/L (ref 3–14)
AST SERPL W P-5'-P-CCNC: 16 U/L (ref 0–45)
BILIRUB SERPL-MCNC: 0.4 MG/DL (ref 0.2–1.3)
BUN SERPL-MCNC: 16 MG/DL (ref 7–30)
CALCIUM SERPL-MCNC: 9.3 MG/DL (ref 8.5–10.1)
CHLORIDE SERPL-SCNC: 106 MMOL/L (ref 94–109)
CO2 SERPL-SCNC: 29 MMOL/L (ref 20–32)
CREAT SERPL-MCNC: 0.91 MG/DL (ref 0.66–1.25)
GFR SERPL CREATININE-BSD FRML MDRD: NORMAL ML/MIN/1.7M2
GLUCOSE SERPL-MCNC: 87 MG/DL (ref 70–99)
POTASSIUM SERPL-SCNC: 4.4 MMOL/L (ref 3.4–5.3)
PROT SERPL-MCNC: 8 G/DL (ref 6.8–8.8)
SODIUM SERPL-SCNC: 141 MMOL/L (ref 133–144)

## 2017-02-21 ENCOUNTER — OFFICE VISIT (OUTPATIENT)
Dept: FAMILY MEDICINE | Facility: CLINIC | Age: 27
End: 2017-02-21
Payer: COMMERCIAL

## 2017-02-21 VITALS
SYSTOLIC BLOOD PRESSURE: 110 MMHG | WEIGHT: 242 LBS | HEIGHT: 77 IN | DIASTOLIC BLOOD PRESSURE: 70 MMHG | BODY MASS INDEX: 28.57 KG/M2 | HEART RATE: 56 BPM

## 2017-02-21 DIAGNOSIS — R42 DIZZINESS: Primary | ICD-10-CM

## 2017-02-21 PROCEDURE — 99213 OFFICE O/P EST LOW 20 MIN: CPT | Performed by: PHYSICIAN ASSISTANT

## 2017-02-21 RX ORDER — MECLIZINE HYDROCHLORIDE 25 MG/1
25 TABLET ORAL EVERY 6 HOURS PRN
Qty: 30 TABLET | Refills: 0 | Status: SHIPPED | OUTPATIENT
Start: 2017-02-21

## 2017-02-21 ASSESSMENT — ENCOUNTER SYMPTOMS
HEMOPTYSIS: 0
CONSTIPATION: 0
DEPRESSION: 0
PALPITATIONS: 0
ABDOMINAL PAIN: 0
PHOTOPHOBIA: 0
EYE DISCHARGE: 0
LOSS OF CONSCIOUSNESS: 0
FREQUENCY: 0
DYSURIA: 0
SHORTNESS OF BREATH: 0
BACK PAIN: 0
DOUBLE VISION: 0
DIAPHORESIS: 0
SEIZURES: 0
NECK PAIN: 0
WEAKNESS: 0
WHEEZING: 0
SENSORY CHANGE: 0
FOCAL WEAKNESS: 0
EYE PAIN: 0
INSOMNIA: 0
TINGLING: 0
SORE THROAT: 0
NAUSEA: 1
DIARRHEA: 0
ORTHOPNEA: 0
HEADACHES: 0
NERVOUS/ANXIOUS: 0
WEIGHT LOSS: 0
DIZZINESS: 1
FEVER: 0
HALLUCINATIONS: 0
HEARTBURN: 0
BLOOD IN STOOL: 0
BLURRED VISION: 0
MYALGIAS: 0
COUGH: 0
SPUTUM PRODUCTION: 0
EYE REDNESS: 0
VOMITING: 0

## 2017-02-21 ASSESSMENT — LIFESTYLE VARIABLES: SUBSTANCE_ABUSE: 0

## 2017-02-21 NOTE — NURSING NOTE
"Chief Complaint   Patient presents with     Dizziness     balance, stomach upset       Initial /70 (Cuff Size: Adult Large)  Pulse 56  Ht 6' 5\" (1.956 m)  Wt 242 lb (109.8 kg)  BMI 28.7 kg/m2 Estimated body mass index is 28.7 kg/(m^2) as calculated from the following:    Height as of this encounter: 6' 5\" (1.956 m).    Weight as of this encounter: 242 lb (109.8 kg).  Medication Reconciliation: complete    Health Maintenance that is potentially due pending provider review:  NONE          "

## 2017-02-21 NOTE — LETTER
[facility]  [address] [place] [id number]  Phone: [phone]  Fax: [phone]    February 21, 2017        Rafy Zepeda  [address]  [place] [address]          To whom it may concern:    RE: Rafy Zepeda    Patient was seen and treated today at our clinic. He has been seen 3 times in the past 6 weeks and has had illnesses. This is very unusual for him and I would not anticipate further problems.    Please contact me for questions or concerns.      Sincerely,        Almas Botello PA-C

## 2017-02-21 NOTE — MR AVS SNAPSHOT
"              After Visit Summary   2017    Rafy Zepeda    MRN: [id number]           Patient Information     Date Of Birth          1990        Visit Information        Provider Department      2017 10:00 AM Almas Botello PA-C [facility]        Today's Diagnoses     Dizziness    -  1       Follow-ups after your visit        Follow-up notes from your care team     Return if symptoms worsen or fail to improve.      Who to contact     If you have questions or need follow up information about today's clinic visit or your schedule please contact [facility] directly at [phone].  Normal or non-critical lab and imaging results will be communicated to you by MyChart, letter or phone within 4 business days after the clinic has received the results. If you do not hear from us within 7 days, please contact the clinic through Nexwayhart or phone. If you have a critical or abnormal lab result, we will notify you by phone as soon as possible.  Submit refill requests through Shoto or call your pharmacy and they will forward the refill request to us. Please allow 3 business days for your refill to be completed.          Additional Information About Your Visit        MyChart Information     Shoto lets you send messages to your doctor, view your test results, renew your prescriptions, schedule appointments and more. To sign up, go to [email or web address] . Click on \"Log in\" on the left side of the screen, which will take you to the Welcome page. Then click on \"Sign up Now\" on the right side of the page.     You will be asked to enter the access code listed below, as well as some personal information. Please follow the directions to create your username and password.     Your access code is: [email or web address]  Expires: 2017  1:58 PM     Your access code will  in 90 days. If you need help or a new code, please call your [facility] or [phone].        Care " "EveryWhere ID     This is your Care EveryWhere ID. This could be used by other organizations to access your [facility] medical records  [id number]        Your Vitals Were     Pulse Height BMI (Body Mass Index)             56 6' 5\" (1.956 m) 28.7 kg/m2          Blood Pressure from Last 3 Encounters:   02/21/17 110/70   01/30/17 134/80   01/16/17 137/75    Weight from Last 3 Encounters:   02/21/17 242 lb (109.8 kg)   01/30/17 246 lb 12.8 oz (111.9 kg)   03/03/16 232 lb (105.2 kg)              Today, you had the following     No orders found for display         Today's Medication Changes          These changes are accurate as of: 2/21/17 11:12 AM.  If you have any questions, ask your nurse or doctor.               Start taking these medicines.        Dose/Directions    meclizine 25 MG tablet   Commonly known as:  ANTIVERT   Used for:  Dizziness   Started by:  Almas Botello PA-C        Dose:  25 mg   Take 1 tablet (25 mg) by mouth every 6 hours as needed for dizziness   Quantity:  30 tablet   Refills:  0            Where to get your medicines      These medications were sent to [place] [address]     Phone:  [phone]     meclizine 25 MG tablet                Primary Care Provider Office Phone # Fax #    Matt Pathak [phone]       [address] [address]        Thank you!     Thank you for choosing [facility]  for your care. Our goal is always to provide you with excellent care. Hearing back from our patients is one way we can continue to improve our services. Please take a few minutes to complete the written survey that you may receive in the mail after your visit with us. Thank you!             Your Updated Medication List - Protect others around you: Learn how to safely use, store and throw away your medicines at www.disposemymeds.org.    "       This list is accurate as of: 2/21/17 11:12 AM.  Always use your most recent med list.                   Brand Name Dispense Instructions for use    CLONIDINE HCL PO      Take 0.2 mg by mouth Reported on 2/21/2017       meclizine 25 MG tablet    ANTIVERT    30 tablet    Take 1 tablet (25 mg) by mouth every 6 hours as needed for dizziness       venlafaxine 37.5 MG 24 hr capsule    EFFEXOR-XR    30 capsule    Take 1 capsule (37.5 mg) by mouth 2 times daily
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
## 2018-07-19 ENCOUNTER — HOSPITAL ENCOUNTER (OUTPATIENT)
Dept: OCCUPATIONAL THERAPY | Facility: CLINIC | Age: 28
Setting detail: THERAPIES SERIES
End: 2018-07-19
Payer: OTHER MISCELLANEOUS

## 2018-07-19 PROCEDURE — 97140 MANUAL THERAPY 1/> REGIONS: CPT | Mod: GO | Performed by: OCCUPATIONAL THERAPIST

## 2018-07-19 PROCEDURE — 97166 OT EVAL MOD COMPLEX 45 MIN: CPT | Mod: GO | Performed by: OCCUPATIONAL THERAPIST

## 2018-07-19 PROCEDURE — 40000839 ZZH STATISTIC HAND THERAPY VISIT: Performed by: OCCUPATIONAL THERAPIST

## 2018-07-19 PROCEDURE — 97110 THERAPEUTIC EXERCISES: CPT | Mod: GO | Performed by: OCCUPATIONAL THERAPIST

## 2018-07-19 NOTE — PROGRESS NOTES
07/19/18 0900   General Information/History   Start Of Care Date 07/19/18   Referring Physician Dr. Pizarro   Orders Evaluate And Treat As Indicated   Orders Date 07/10/18   Medical Diagnosis Stiffness of right hand , Zone 2 flexor t endon lacerations to RSF, RRF, injury and repair on 6/27/18   Additional Occupational Profile Info/Pertinent history of current problem Patient reports that on May 7th 2018, he was operating a skid  and it tipped forward and hand got outside cab and got caught and pinched lacerating hand, breaking wrist, thumb. He does not have specifics of everything.  He had the repair on May 7th with pinning and tendon repair. . He says his joint was remade in his small finger. He says the small finger was a salvage procedure just to keet it on his hand with no expected motion other than from the MP joint.  He had trouble with wound closure and had a skin graft on 6-27-18. Wounds now healed. He has not had any therapy to date and present with a hook style in small and ring fingers.   Previous treatment or current condition none   Past medical history smoker 1/4 PPD   How/Where did it occur During contact with an object;At work   Onset date of current episode/exacerbation 05/07/18   Date of surgery 06/27/18  (skin graft - initial surgery 5/7/18)   Surgical procedure see above in history- do not have surgical report from initial surgeon at the moment but patient will try to get   Chronicity New   Hand Dominance Right   Affected side Right   Functional limitations perform activities of daily living;perform required work activities;perform desired leisure / sports activities   Reported Symptoms Pain;Loss of Motion/Stiffness;Loss of strength;Tingling;Numbness;Locking;Edema   Prior level of function Independent ADL;Independent IADL   Important Activities fishing, work out lift weights. play softball, kayaking, build things   Patient role/Employment history Employed   Occupation     Employment Status Currently not working due to present problem   Primary Job Tasks Assembly;Pushing/pulling;Repetitive tasks;Lifting;Carrying;Gripping/pinching;Operating a machine;Reaching;Prolonged standing   Patient/Family goals statement Patient would like to be able to move thumb better to  things   Fall Risk Screen   Fall screen completed by OT   Have you fallen 2 or more times in the past year? Yes   Is patient a fall risk? No   Fall screen comments Patient just does things like slipping on ice or tripping on things   Pain   Pain Primary Pain Report   Primary Pain Report   Location entire right hand   Pain Quality Aching;Cramping;Dull;Throbbing   Frequency Intermittent   Scale 4/10;9/10   Pain Is Same All Of The Time   Pain Is Exacerbated By Lifting;Carrying;Pinching;Pushing;Gripping;Twisting , Pulling;Activity/movement   Pain Is Relieved By Immobilization;Rest;Nsaids,analgesics;Other   Progression Since Onset Rapidly Improving   Edema   Edema Distal Palmar Crease;Thumb;Distal Wrist Crease   Thumb (measured in cm)   IP -  - Left MP- 9.5   P1 -  - Right MP-10.5   Distal Wrist Crease (measured in cm)   Distal Wrist Crease - - Left 18.5   Distal Wrist Crease - - Right 19.5   Scar/Wound   Scar/Wound Comments scars are healing but are tight and there is scabbed over portion in web of small and ring finger   ROM   ROM AROM    AROM (PROM): right   Ext/Flex INDEX LONG RING SMALL  THUMB   MP 0/45 0/60 0/55 0/55 MP 0/35    PIP 0/67 30/115 55/65 30/45 IP 0/50    DIP 0/50 0/60 30/35 40/40 RABD 3.5 Left 6        PABD          AROM   AROM Wrist   Comments see chart   Wrist   Wrist Extension - Right 60   Wrist Flexion - Right 60   Sensation Findings   Sensation Findings Other (see comments)   Sensation Comments No time to formally test today , however patient reports numbness in thumb, ring and small fingers and the back of his hand   Strength   Strength Strength    Avg - Left 145    Avg - Right 28   Lateral  Pinch - Left 30   Lateral Pinch - Right 6   3 Point Pinch - Left 30   3 Point Pinch - Right 8   Education Assessment   Preferred Learning Style Demonstration   Barriers to Learning No barriers   Therapy Interventions   Planned Therapy Interventions Ultrasound;Light Therapy;Fluidotherapy;Strengthening;ROM;Stretching;Manual Therapy;Splinting;Education of splint wear, care, fit and precautions;Scar Management;Edema Management;Self Care/Home Management;Home Program   Therapy plan comments To be added as appropriate   Clinical Impression   Criteria for Skilled Therapeutic Interventions Met yes   OT Diagnosis decreased ADL's   Influenced by the following impairments Pain;Edema;Decreased range of motion;Decreased strength;Impaired sensation   Assessment of Occupational Performance 5 or more Performance Deficits   Identified Performance Deficits writing, lifting, carrying, hobbies, sports, lifting groceries, washing hair, weight bearing, preparing food, dressing, using tools, opening jars , throwing ball   Clinical Decision Making (Complexity) Moderate complexity   Therapy Frequency 3x/week   Predicted Duration of Therapy Intervention (days/wks) 6 weeks   Risks and Benefits of Treatment have been explained. Yes   Patient, Family & other staff in agreement with plan of care Yes   Clinical Impression Comments Patient presents with severe stiffness post traumatic injury to right dominant hand. Anticipate patient will benefit from skilled Hand Therapy to meet functional goals.   Hand Goals   Hand Goals Household Chores;Dressing;Eating;Work   Eating   Current Functional Task Cutting food   Previous Performance Level Independent   Current Performance Level Severe difficulty   Goal Target Task Cut food   Goal Target Performance Level Moderate difficulty   Due Date 08/09/18   Date Goal Met 08/09/18   Dressing   Current Functional Task Snapping;Buttoning   Previous Performance Level Independent   Current Performance Level Severe  difficulty   Goal Target Task Button shirt;Snap pants   Goal Target Performance Level Moderate difficulty   Household Chores   Current Functional Task Gripping   Previous Performance Level Independent   Current Performance Level Unable   Goal Target Task Open a tight or new jar   Goal Target Performance Level Mild difficulty   Due Date 09/05/18   Work   Current Functional Task Manipulating tools   Previous Performance Level Independent   Current Performance Level Unable   Goal Target Task Hold and manipulate tools   Goal Target Performance Level Mild difficulty   Due Date 09/03/18   Roberta Stone, OTR/L CHT  Occupational Therapist, Certified Hand Therapist

## 2018-07-25 ENCOUNTER — HOSPITAL ENCOUNTER (OUTPATIENT)
Dept: OCCUPATIONAL THERAPY | Facility: CLINIC | Age: 28
Setting detail: THERAPIES SERIES
End: 2018-07-25
Attending: PLASTIC SURGERY
Payer: OTHER MISCELLANEOUS

## 2018-07-25 PROCEDURE — 97140 MANUAL THERAPY 1/> REGIONS: CPT | Mod: GO | Performed by: OCCUPATIONAL THERAPIST

## 2018-07-25 PROCEDURE — 97760 ORTHOTIC MGMT&TRAING 1ST ENC: CPT | Mod: GO | Performed by: OCCUPATIONAL THERAPIST

## 2018-07-25 PROCEDURE — 40000839 ZZH STATISTIC HAND THERAPY VISIT: Performed by: OCCUPATIONAL THERAPIST

## 2018-07-25 PROCEDURE — 97022 WHIRLPOOL THERAPY: CPT | Mod: GO | Performed by: OCCUPATIONAL THERAPIST

## 2018-07-25 PROCEDURE — 97110 THERAPEUTIC EXERCISES: CPT | Mod: GO | Performed by: OCCUPATIONAL THERAPIST

## 2018-08-02 ENCOUNTER — HOSPITAL ENCOUNTER (OUTPATIENT)
Dept: OCCUPATIONAL THERAPY | Facility: CLINIC | Age: 28
Setting detail: THERAPIES SERIES
End: 2018-08-02
Attending: PLASTIC SURGERY
Payer: OTHER MISCELLANEOUS

## 2018-08-02 PROCEDURE — 40000839 ZZH STATISTIC HAND THERAPY VISIT: Performed by: OCCUPATIONAL THERAPIST

## 2018-08-02 PROCEDURE — 97110 THERAPEUTIC EXERCISES: CPT | Mod: GO | Performed by: OCCUPATIONAL THERAPIST

## 2018-08-02 PROCEDURE — 97018 PARAFFIN BATH THERAPY: CPT | Mod: GO | Performed by: OCCUPATIONAL THERAPIST

## 2018-08-02 PROCEDURE — 97140 MANUAL THERAPY 1/> REGIONS: CPT | Mod: GO | Performed by: OCCUPATIONAL THERAPIST

## 2018-08-09 ENCOUNTER — HOSPITAL ENCOUNTER (OUTPATIENT)
Dept: OCCUPATIONAL THERAPY | Facility: CLINIC | Age: 28
Setting detail: THERAPIES SERIES
End: 2018-08-09
Attending: PLASTIC SURGERY
Payer: OTHER MISCELLANEOUS

## 2018-08-09 PROCEDURE — 40000839 ZZH STATISTIC HAND THERAPY VISIT: Performed by: OCCUPATIONAL THERAPIST

## 2018-08-09 PROCEDURE — 97140 MANUAL THERAPY 1/> REGIONS: CPT | Mod: GO | Performed by: OCCUPATIONAL THERAPIST

## 2018-08-20 ENCOUNTER — HOSPITAL ENCOUNTER (OUTPATIENT)
Dept: OCCUPATIONAL THERAPY | Facility: CLINIC | Age: 28
Setting detail: THERAPIES SERIES
End: 2018-08-20
Attending: PLASTIC SURGERY
Payer: OTHER MISCELLANEOUS

## 2018-08-20 PROCEDURE — 97022 WHIRLPOOL THERAPY: CPT | Mod: GO | Performed by: OCCUPATIONAL THERAPIST

## 2018-08-20 PROCEDURE — 40000839 ZZH STATISTIC HAND THERAPY VISIT: Performed by: OCCUPATIONAL THERAPIST

## 2018-08-20 PROCEDURE — 97110 THERAPEUTIC EXERCISES: CPT | Mod: GO | Performed by: OCCUPATIONAL THERAPIST

## 2018-08-20 PROCEDURE — 97140 MANUAL THERAPY 1/> REGIONS: CPT | Mod: GO | Performed by: OCCUPATIONAL THERAPIST

## 2018-08-22 ENCOUNTER — HOSPITAL ENCOUNTER (OUTPATIENT)
Dept: OCCUPATIONAL THERAPY | Facility: CLINIC | Age: 28
Setting detail: THERAPIES SERIES
End: 2018-08-22
Attending: PLASTIC SURGERY
Payer: OTHER MISCELLANEOUS

## 2018-08-22 PROCEDURE — 40000839 ZZH STATISTIC HAND THERAPY VISIT: Performed by: OCCUPATIONAL THERAPIST

## 2018-08-22 PROCEDURE — 97110 THERAPEUTIC EXERCISES: CPT | Mod: GO | Performed by: OCCUPATIONAL THERAPIST

## 2018-08-22 PROCEDURE — 97140 MANUAL THERAPY 1/> REGIONS: CPT | Mod: GO | Performed by: OCCUPATIONAL THERAPIST

## 2018-08-22 PROCEDURE — 97022 WHIRLPOOL THERAPY: CPT | Mod: GO | Performed by: OCCUPATIONAL THERAPIST

## 2018-08-29 ENCOUNTER — HOSPITAL ENCOUNTER (OUTPATIENT)
Dept: OCCUPATIONAL THERAPY | Facility: CLINIC | Age: 28
Setting detail: THERAPIES SERIES
End: 2018-08-29
Attending: PLASTIC SURGERY
Payer: OTHER MISCELLANEOUS

## 2018-08-29 PROCEDURE — 40000839 ZZH STATISTIC HAND THERAPY VISIT: Performed by: OCCUPATIONAL THERAPIST

## 2018-08-29 PROCEDURE — 97110 THERAPEUTIC EXERCISES: CPT | Mod: GO | Performed by: OCCUPATIONAL THERAPIST

## 2018-08-29 PROCEDURE — 97035 APP MDLTY 1+ULTRASOUND EA 15: CPT | Mod: GO | Performed by: OCCUPATIONAL THERAPIST

## 2018-08-29 PROCEDURE — 97022 WHIRLPOOL THERAPY: CPT | Mod: GO | Performed by: OCCUPATIONAL THERAPIST

## 2018-08-29 PROCEDURE — 97140 MANUAL THERAPY 1/> REGIONS: CPT | Mod: GO | Performed by: OCCUPATIONAL THERAPIST

## 2018-09-13 ENCOUNTER — HOSPITAL ENCOUNTER (OUTPATIENT)
Dept: OCCUPATIONAL THERAPY | Facility: CLINIC | Age: 28
Setting detail: THERAPIES SERIES
End: 2018-09-13
Attending: PLASTIC SURGERY
Payer: OTHER MISCELLANEOUS

## 2018-09-13 PROCEDURE — 97140 MANUAL THERAPY 1/> REGIONS: CPT | Mod: GO | Performed by: OCCUPATIONAL THERAPIST

## 2018-09-13 PROCEDURE — 97110 THERAPEUTIC EXERCISES: CPT | Mod: GO | Performed by: OCCUPATIONAL THERAPIST

## 2018-09-13 PROCEDURE — 97022 WHIRLPOOL THERAPY: CPT | Mod: GO | Performed by: OCCUPATIONAL THERAPIST

## 2018-09-13 PROCEDURE — 40000839 ZZH STATISTIC HAND THERAPY VISIT: Performed by: OCCUPATIONAL THERAPIST

## 2018-09-13 PROCEDURE — 97035 APP MDLTY 1+ULTRASOUND EA 15: CPT | Mod: GO | Performed by: OCCUPATIONAL THERAPIST

## 2018-09-14 ENCOUNTER — HOSPITAL ENCOUNTER (OUTPATIENT)
Dept: OCCUPATIONAL THERAPY | Facility: CLINIC | Age: 28
Setting detail: THERAPIES SERIES
End: 2018-09-14
Attending: PLASTIC SURGERY
Payer: OTHER MISCELLANEOUS

## 2018-09-14 PROCEDURE — 97140 MANUAL THERAPY 1/> REGIONS: CPT | Mod: GO | Performed by: OCCUPATIONAL THERAPIST

## 2018-09-14 PROCEDURE — 97022 WHIRLPOOL THERAPY: CPT | Mod: GO | Performed by: OCCUPATIONAL THERAPIST

## 2018-09-14 PROCEDURE — 97035 APP MDLTY 1+ULTRASOUND EA 15: CPT | Mod: GO | Performed by: OCCUPATIONAL THERAPIST

## 2018-09-14 PROCEDURE — 97110 THERAPEUTIC EXERCISES: CPT | Mod: GO | Performed by: OCCUPATIONAL THERAPIST

## 2018-09-14 PROCEDURE — 40000839 ZZH STATISTIC HAND THERAPY VISIT: Performed by: OCCUPATIONAL THERAPIST

## 2018-09-17 ENCOUNTER — HOSPITAL ENCOUNTER (OUTPATIENT)
Dept: OCCUPATIONAL THERAPY | Facility: CLINIC | Age: 28
Setting detail: THERAPIES SERIES
End: 2018-09-17
Attending: PLASTIC SURGERY
Payer: OTHER MISCELLANEOUS

## 2018-09-17 PROCEDURE — 97110 THERAPEUTIC EXERCISES: CPT | Mod: GO | Performed by: OCCUPATIONAL THERAPIST

## 2018-09-17 PROCEDURE — 97140 MANUAL THERAPY 1/> REGIONS: CPT | Mod: GO | Performed by: OCCUPATIONAL THERAPIST

## 2018-09-17 PROCEDURE — 97035 APP MDLTY 1+ULTRASOUND EA 15: CPT | Mod: GO | Performed by: OCCUPATIONAL THERAPIST

## 2018-09-17 PROCEDURE — 40000839 ZZH STATISTIC HAND THERAPY VISIT: Performed by: OCCUPATIONAL THERAPIST

## 2018-09-17 NOTE — ADDENDUM NOTE
Encounter addended by: Roberta Stone OT on: 9/17/2018  8:26 AM<BR>     Actions taken: Flowsheet accepted, Sign clinical note, Charge Capture section accepted

## 2018-09-17 NOTE — PROGRESS NOTES
09/14/18 0500   Notes   Note Type Progress Note   Providers   Providers Roberta Stone, OTR/L, CHT   Referring Physician Dr. Pizarro   Reporting Period   Reporting period from 07/19/18   Reporting period to 09/14/18   General Information   Rxs Authorized 19 ( 10/19/18)   Rxs Used 9   Medical Diagnosis Stiffness of right hand , Zone 2 flexor tendon lacerations to RSF, RRF, injury and repair on 6/27/18   Orders Evaluate And Treat As Indicated   Start Of Care Date 07/19/18   Onset date of current episode/exacerbation 05/07/18   Surgical procedure see above in history- do not have surgical report from initial surgeon at the moment but patient will try to get   Date of surgery 06/27/18  (skin graft - initial surgery 5/7/18)   Date for Next MD Appointment 10/03/18  (nothing scheduled)   Other pertinent information Having surgery in November to release ring finger   Subjective Measures   Subjective Patient reports to be much more functional over the past few months.   Initial Pain level 9/10  (at best 4 at worst)   Current Pain level 0/10   Patient Reported % Functional Improvement 70 % UEFI 66 was 28   Functional Improvement Reported Work Activities;Leisure Activities;Self care activities;Gripping;Lifting;Carrying   Objective Measures   Objective Measures Edema;ROM;Strength;Objective Measure 1;Objective Measure 2   Edema   Location (anatomical) MP thumb   Location Right;Left   Edema Comments 10. 9.5 right was 10 DWC 18.5 was 19.5   ROM   Location (anatomical) wrist   Location Right   Motion Ext/Flex   ROM Comments   AROM    7/19/18           AROM (PROM): right   Ext/Flex INDEX LONG RING SMALL   THUMB   MP 0/45 0/60 0/55 0/55 MP 0/35     PIP 0/67 30/115 55/65 30/45 IP 0/50     DIP 0/50 0/60 30/35 40/40 RABD 3.5 Left 6             PABD         AROM     9/14/18          AROM (PROM): right   Ext/Flex INDEX LONG RING SMALL   THUMB   MP 0/63 0/75 0/80 0/85 MP 0/35     PIP 0/112 20/115 50/70 35/45 IP 0/60     DIP 0/55 0/60  35/35 35/30 RABD  5 Left 6             PABD              70/75 was 60/60   Strength   Location Right;Left    63# ( was 28 initially) ,155#   Watters Pinch 16 Was 6 initially, 30   Lateral Pinch 12 was 8 initially, 30   Objective Measure 1   Objective Measure PIP extension ring   Details 55( was 42)   Objective Measure 2   Objective Measure passive composite flexion of ring   Details 2cm to DPC   Modalities   Modalities Ultrasound;Fluidotherapy   Ultrasound -Type Continuous;5 cm sound head   Skilled Interventions To Increase Tissue Extensibility;To Soften Scar Tissue   Intensity 1.0w/cm2   Duration (does not include the 3-5 min set up/clean up time) 8 min   Frequency 3 MHz   Medication with extension stretch to ring finger   Fluidotherapy -Duration 10 min   Skilled Interventions To Increase Blood Flow For Improved Healing;To Increase Tissue Extensibility;To Increase Tissue Excursion   Airflow high   Temperature 122   Set Up with ex   Therapeutic Exercise   Skilled Interventions (verbal and physical cuing needed)   Minutes of Treatment 8   ROM/AROM   ROM/AROM AROM Fingers;AROM Intrinsics;AROM Thumb   AROM Fingers All Planes   Sets/Reps 1 set;10 reps;reviewed;HEP   AROM Intrinsics All Planes   Sets/Reps 1 set;10 reps;reviewed;HEP   AROM Thumb All Planes   Sets/Reps 1 set;10 reps;reviewed;HEP   PROM   PROM PROM Fingers;PROM Thumb;PROM Wrist  (gentle as above)   Other Exer/Activities/Educ   Other Exer/Activities/Educ - All exercises are part of HEP unless otherwise noted Exercise 1   Special Techniques   Special Techniques TENDON GLIDING SPECIAL TECHNIQUES;BLOCKING SPECIAL TECHNIQUES   Tendon Gliding Hook fist;Table top;Full fist;Straight fist   Sets/Reps 1 set;10 reps   Blocking PIP;DIP   Sets/Reps 1 set;10 reps   Manual   Manual Scar Mob Position;MFR   Skilled Interventions To Increase Tissue Excursion;To Increase Tissue Extensibility;To Soften Scar Tissue   Minutes of Treatment 10   MFR Finger;other  (volar and  dorsal hand and fingers)   Position Sitting   Description/ Technique moderate pressure, use of gua-sha.  passive stretch to ring and small fingers during volar release   Scar Mob Position Sitting   Scar Mob Location dorsal and volar hand/wrist   Description/ Technique circular;3 dimensions   Time importance of continued HEP   Hand Goals   Hand Goals Household Chores;Dressing;Eating;Work   Eating   Current Functional Task Cutting food   Previous Performance Level Severe limitations   Current Performance Level Mild difficulty   Goal Target Task Cut food   Goal Target Performance Level (no problem)   Due Date 08/09/18   Date Goal Met 08/09/18   Dressing   Current Functional Task Snapping;Buttoning   Previous Performance Level Moderate limitations   Current Performance Level Mild difficulty   Goal Target Task Button shirt;Snap pants   Goal Target Performance Level Mild difficulty   Due Date 08/23/18   Date Goal Met 09/14/18   Household Chores   Current Functional Task Gripping   Previous Performance Level Independent   Current Performance Level Mild difficulty   Goal Target Task Open a tight or new jar   Goal Target Performance Level Mild difficulty   Due Date 09/05/18   Date Goal Met 08/09/18   Work   Current Functional Task Manipulating tools   Previous Performance Level Unable   Current Performance Level Moderate difficulty   Goal Target Task Hold and manipulate tools   Goal Target Performance Level Mild difficulty   Due Date 09/03/18   If goal not met, Why? progressing toward   Assessment   Clinical Impression(s) Comments Patient has made significant gains in the past few months , working hard at home s he has not been into therapy as often as advised. Anticipate further gains in physical and functional use of right dominant hand   Response to Therapy: Declines in    Response to Therapy: Improvements Pain;Self Care Skills   Equipment   Equipment elastomer scar pad and coban, putty   Education   Learner Patient    Readiness Eager   Method Booklet/handout;Explanation;Demonstration   Response Verbalizes understanding;Demonstrates understanding   Education Notes see home program   Plan   Home program ex as above, scar massage,heat   Plan Will continue 2x/week working to improve ROM and stregth for enhance functional until next surgery   Total Session Time   Roberta Stone OTR/L CHT  Occupational Therapist, Certified Hand Therapist

## 2018-10-11 ENCOUNTER — HOSPITAL ENCOUNTER (OUTPATIENT)
Dept: OCCUPATIONAL THERAPY | Facility: CLINIC | Age: 28
Setting detail: THERAPIES SERIES
End: 2018-10-11
Attending: PLASTIC SURGERY
Payer: OTHER MISCELLANEOUS

## 2018-10-11 PROCEDURE — 97035 APP MDLTY 1+ULTRASOUND EA 15: CPT | Mod: GO | Performed by: OCCUPATIONAL THERAPIST

## 2018-10-11 PROCEDURE — 97110 THERAPEUTIC EXERCISES: CPT | Mod: GO | Performed by: OCCUPATIONAL THERAPIST

## 2018-10-11 PROCEDURE — 97140 MANUAL THERAPY 1/> REGIONS: CPT | Mod: GO | Performed by: OCCUPATIONAL THERAPIST

## 2018-10-11 PROCEDURE — 40000839 ZZH STATISTIC HAND THERAPY VISIT: Performed by: OCCUPATIONAL THERAPIST

## 2018-10-18 ENCOUNTER — HOSPITAL ENCOUNTER (OUTPATIENT)
Dept: OCCUPATIONAL THERAPY | Facility: CLINIC | Age: 28
Setting detail: THERAPIES SERIES
End: 2018-10-18
Attending: PLASTIC SURGERY
Payer: OTHER MISCELLANEOUS

## 2018-10-18 PROCEDURE — 40000839 ZZH STATISTIC HAND THERAPY VISIT: Performed by: OCCUPATIONAL THERAPIST

## 2018-10-18 PROCEDURE — 97140 MANUAL THERAPY 1/> REGIONS: CPT | Mod: GO | Performed by: OCCUPATIONAL THERAPIST

## 2018-10-18 PROCEDURE — 97035 APP MDLTY 1+ULTRASOUND EA 15: CPT | Mod: GO | Performed by: OCCUPATIONAL THERAPIST

## 2018-10-22 ENCOUNTER — HOSPITAL ENCOUNTER (OUTPATIENT)
Dept: OCCUPATIONAL THERAPY | Facility: CLINIC | Age: 28
Setting detail: THERAPIES SERIES
End: 2018-10-22
Attending: PLASTIC SURGERY
Payer: OTHER MISCELLANEOUS

## 2018-10-22 PROCEDURE — 40000839 ZZH STATISTIC HAND THERAPY VISIT: Performed by: OCCUPATIONAL THERAPIST

## 2018-10-22 PROCEDURE — 97140 MANUAL THERAPY 1/> REGIONS: CPT | Mod: GO | Performed by: OCCUPATIONAL THERAPIST

## 2018-10-22 PROCEDURE — 97110 THERAPEUTIC EXERCISES: CPT | Mod: GO | Performed by: OCCUPATIONAL THERAPIST

## 2018-10-25 ENCOUNTER — HOSPITAL ENCOUNTER (OUTPATIENT)
Dept: OCCUPATIONAL THERAPY | Facility: CLINIC | Age: 28
Setting detail: THERAPIES SERIES
End: 2018-10-25
Attending: PLASTIC SURGERY
Payer: OTHER MISCELLANEOUS

## 2018-10-25 PROCEDURE — 40000839 ZZH STATISTIC HAND THERAPY VISIT: Performed by: OCCUPATIONAL THERAPIST

## 2018-10-25 PROCEDURE — 97110 THERAPEUTIC EXERCISES: CPT | Mod: GO | Performed by: OCCUPATIONAL THERAPIST

## 2018-10-25 PROCEDURE — 97035 APP MDLTY 1+ULTRASOUND EA 15: CPT | Mod: GO | Performed by: OCCUPATIONAL THERAPIST

## 2018-10-25 PROCEDURE — 97140 MANUAL THERAPY 1/> REGIONS: CPT | Mod: GO | Performed by: OCCUPATIONAL THERAPIST

## 2018-11-15 ENCOUNTER — HOSPITAL ENCOUNTER (OUTPATIENT)
Dept: OCCUPATIONAL THERAPY | Facility: CLINIC | Age: 28
Setting detail: THERAPIES SERIES
End: 2018-11-15
Attending: PLASTIC SURGERY
Payer: OTHER MISCELLANEOUS

## 2018-11-15 PROCEDURE — 97035 APP MDLTY 1+ULTRASOUND EA 15: CPT | Mod: GO | Performed by: OCCUPATIONAL THERAPIST

## 2018-11-15 PROCEDURE — 40000839 ZZH STATISTIC HAND THERAPY VISIT: Performed by: OCCUPATIONAL THERAPIST

## 2018-11-15 PROCEDURE — 97110 THERAPEUTIC EXERCISES: CPT | Mod: GO | Performed by: OCCUPATIONAL THERAPIST

## 2019-01-08 ENCOUNTER — TELEPHONE (OUTPATIENT)
Dept: ALLERGY | Facility: CLINIC | Age: 29
End: 2019-01-08

## 2019-01-08 NOTE — TELEPHONE ENCOUNTER
Patient continues to have hives. Patient given the options to make an appointment with Dr. Heck on Thursday in [place], be seen with PCP or go to Urgent care if hives are painful or if having discomfort. Patient wants to keep his Friday appointment.     Rita Leong RN

## 2019-01-08 NOTE — TELEPHONE ENCOUNTER
Reason for Call:  Other call back    Detailed comments: pt mother calling stating pt is covered head to toe in hives. Very itchy, benadryl not working. Would like to know what he can do in the mean time until his appt on Friday.     Phone Number Patient can be reached at: Other phone number:  [phone] - mother gavin    Best Time: after 430     Can we leave a detailed message on this number? YES    Call taken on 1/8/2019 at 2:48 PM by Gretel Lincoln

## 2019-01-11 ENCOUNTER — OFFICE VISIT (OUTPATIENT)
Dept: ALLERGY | Facility: CLINIC | Age: 29
End: 2019-01-11
Payer: COMMERCIAL

## 2019-01-11 VITALS
TEMPERATURE: 97.4 F | HEART RATE: 90 BPM | BODY MASS INDEX: 26.55 KG/M2 | WEIGHT: 224.87 LBS | SYSTOLIC BLOOD PRESSURE: 132 MMHG | RESPIRATION RATE: 16 BRPM | HEIGHT: 77 IN | OXYGEN SATURATION: 97 % | DIASTOLIC BLOOD PRESSURE: 83 MMHG

## 2019-01-11 DIAGNOSIS — R21 RASH: Primary | ICD-10-CM

## 2019-01-11 PROCEDURE — 99203 OFFICE O/P NEW LOW 30 MIN: CPT | Performed by: ALLERGY & IMMUNOLOGY

## 2019-01-11 RX ORDER — DEXTROAMPHETAMINE SACCHARATE, AMPHETAMINE ASPARTATE, DEXTROAMPHETAMINE SULFATE AND AMPHETAMINE SULFATE 7.5; 7.5; 7.5; 7.5 MG/1; MG/1; MG/1; MG/1
TABLET ORAL
COMMUNITY
Start: 2018-05-23

## 2019-01-11 RX ORDER — DOXYCYCLINE 100 MG/1
100 CAPSULE ORAL 2 TIMES DAILY
Qty: 28 CAPSULE | Refills: 0 | Status: SHIPPED | OUTPATIENT
Start: 2019-01-11 | End: 2019-01-25

## 2019-01-11 ASSESSMENT — ENCOUNTER SYMPTOMS
COUGH: 0
JOINT SWELLING: 0
SINUS PRESSURE: 0
DIARRHEA: 0
ARTHRALGIAS: 0
ADENOPATHY: 0
ACTIVITY CHANGE: 0
HEADACHES: 0

## 2019-01-11 ASSESSMENT — MIFFLIN-ST. JEOR: SCORE: 2117.38

## 2019-01-11 NOTE — PATIENT INSTRUCTIONS
Take doxycycline twice daily for 14 days. If it doesn't get better or gets better and then restarts again, see Dermatology.  The referral is in the system.

## 2019-01-11 NOTE — LETTER
1/11/2019         RE: Rafy Zepeda  [id number]  [place] [address]        Dear Colleague,    Thank you for referring your patient, Rafy Zepeda, to the [facility]. Please see a copy of my visit note below.    SUBJECTIVE:                                                                   Rafy Zepeda presents today to our Allergy Clinic at [facility] for a new patient visit.  As you know, he is a 28 year old male with a recurrent rash since November.  His mother set up an appointment for him mentioning that the patient is covered with hives head to toe.   It would last for 1 week, and then resolves. It started after moving to his house. They have septic water. Nobody else in the household has a similar rash.  A lot of stress for the last 10 months. He is in court in regards to custody of his child.   He denies angioedema, vomiting, nausea, diarrhea, wheezing, or rhinoconjunctivitis symptoms.  Patient Active Problem List   Diagnosis     CARDIOVASCULAR SCREENING; LDL GOAL LESS THAN 160     Generalized anxiety disorder     ADD (attention deficit disorder)     Head injury, closed, with LOC of unknown duration (H)       Past Medical History:   Diagnosis Date     Meningitis, unspecified(322.9) 10/1996      Problem (# of Occurrences) Relation (Name,Age of Onset)    Allergies (1) Mother    Anxiety Disorder (2) Mother, Sister    Asthma (1) Mother    Cancer (1) Paternal Grandmother: lung    Diabetes (1) Maternal Grandmother    Hypertension (2) Maternal Grandmother, Paternal Grandfather        Past Surgical History:   Procedure Laterality Date     HAND SURGERY Right 05/07/2018     NOSE SURGERY       Social History     Socioeconomic History     Marital status: Single     Spouse name: None     Number of children: None     Years of education: None     Highest education level: None   Social Needs     Financial resource strain: None     Food insecurity - worry: None     Food insecurity -  inability: None     Transportation needs - medical: None     Transportation needs - non-medical: None   Occupational History     Occupation:    Tobacco Use     Smoking status: Current Some Day Smoker     Packs/day: 0.50     Types: Cigarettes     Smokeless tobacco: Former User     Types: Chew     Quit date: 11/1/2011     Tobacco comment: 1 pack per week   Substance and Sexual Activity     Alcohol use: Yes     Drug use: No     Sexual activity: Not Currently     Partners: Female     Birth control/protection: Condom   Other Topics Concern     Parent/sibling w/ CABG, MI or angioplasty before 65F 55M? No   Social History Narrative    January 11, 2019    ENVIRONMENTAL HISTORY: The family lives in a 22 year old home, [place] The home is heated with a forced air. They do have central air conditioning. The patient's bedroom is furnished with feather/wool bedding or pillows and carpeting in bedroom.  Pets inside the house include 3 dogs. 2 cats that are outside only. There is no history of cockroach or mice infestation. There are 4 smokers in the house.  The house does not have a damp basement.            Review of Systems   Constitutional: Negative for activity change, fatigue and fever.   HENT: Negative for congestion, dental problem, ear pain, facial swelling, nosebleeds, postnasal drip, rhinorrhea, sinus pressure and sneezing.    Eyes: Negative for discharge, redness and itching.   Respiratory: Negative for cough, chest tightness, shortness of breath and wheezing.    Cardiovascular: Negative for chest pain.   Gastrointestinal: Negative for diarrhea, nausea and vomiting.   Musculoskeletal: Negative for arthralgias, joint swelling and myalgias.   Skin: Positive for rash.   Neurological: Negative for headaches.   Hematological: Negative for adenopathy.   Psychiatric/Behavioral: Negative for behavioral problems and self-injury.           Current Outpatient  "Medications:      amphetamine-dextroamphetamine (ADDERALL) 30 MG tablet, TAKE 1 TABLET BY MOUTH EVERY A.M. AND 1 TAB AT 1 PM., Disp: , Rfl:      doxycycline hyclate (VIBRAMYCIN) 100 MG capsule, Take 1 capsule (100 mg) by mouth 2 times daily for 14 days, Disp: 28 capsule, Rfl: 0     venlafaxine (EFFEXOR-XR) 37.5 MG 24 hr capsule, Take 1 capsule (37.5 mg) by mouth 2 times daily, Disp: 30 capsule, Rfl:      meclizine (ANTIVERT) 25 MG tablet, Take 1 tablet (25 mg) by mouth every 6 hours as needed for dizziness (Patient not taking: Reported on 1/11/2019), Disp: 30 tablet, Rfl: 0  Immunization History   Administered Date(s) Administered     DTAP (<7y) 04/14/1997     HEPA 08/03/2006, 07/22/2010     HPV 11/03/2015     HepB 12/18/2003, 01/15/2004, 04/14/2004     Hib (PRP-T) 1992, 1992, 1992, 01/04/1994     Historical DTP/aP 1992, 1992, 1992, 01/04/1994     MMR 08/03/1993, 12/18/2003, 08/03/2006     Meningococcal (Menactra ) 08/03/2006     OPV, trivalent, live 1992, 1992, 01/09/1994, 04/14/1997     TDAP Vaccine (Adacel) 02/28/2016     Tdap (Adacel,Boostrix) 01/15/2004, 08/03/2006     Allergies   Allergen Reactions     Acetaminophen Nausea and Vomiting and Nausea     Ibuprofen Nausea     Sulfamethoxazole-Trimethoprim GI Disturbance     Other reaction(s): Gastrointestinal  Vomiting, nausea  Vomiting, nausea       Soap Rash     Unsure what kind of soap at this time     OBJECTIVE:                                                                 /83 (BP Location: Left arm, Patient Position: Sitting, Cuff Size: Adult Regular)   Pulse 90   Temp 97.4  F (36.3  C) (Oral)   Resp 16   Ht 1.956 m (6' 5\")   Wt 102 kg (224 lb 13.9 oz)   SpO2 97%   BMI 26.67 kg/m           Physical Exam   Constitutional: He is oriented to person, place, and time. No distress.   HENT:   Head: Normocephalic and atraumatic.   Right Ear: Tympanic membrane, external ear and ear canal normal.   Left Ear: " Tympanic membrane, external ear and ear canal normal.   Nose: No mucosal edema or rhinorrhea.   Eyes: Conjunctivae are normal. Right eye exhibits no discharge. Left eye exhibits no discharge.   Neck: Normal range of motion.   Cardiovascular: Normal rate, regular rhythm and normal heart sounds.   No murmur heard.  Pulmonary/Chest: Effort normal and breath sounds normal. No respiratory distress. He has no wheezes. He has no rales.   Musculoskeletal: Normal range of motion.   Lymphadenopathy:     He has no cervical adenopathy.   Neurological: He is alert and oriented to person, place, and time.   Skin: Skin is warm. He is not diaphoretic.   On the forehead, eyelids, nose, cheeks, forearms, multiple circular scabs in various stages of healing process. See pictures.   Nursing note and vitals reviewed.      ASSESSMENT/PLAN:         Visit Diagnoses     Rash    -  Primary  Current rash is not consistent with urticaria or angioedema.  Folliculitis versus constantly picking his skin due to stress.  -Prescribed doxycycline.  If he continues having symptoms, set up an appointment with Dermatology.  Referral provided.    Relevant Medications    doxycycline hyclate (VIBRAMYCIN) 100 MG capsule    Other Relevant Orders    DERMATOLOGY REFERRAL        Return if symptoms worsen or fail to improve.    Thank you for allowing us to participate in the care of this patient. Please feel free to contact us if there are any questions or concerns about the patient.    Disclaimer: This note consists of symbols derived from keyboarding, dictation and/or voice recognition software. As a result, there may be errors in the script that have gone undetected. Please consider this when interpreting information found in this chart.    Rowdy Heck MD, [facility]  Allergy, Asthma and Immunology  [place], [place] and [place]      Rash pictures.    Again, thank you for allowing me to participate in the care of your patient.         Sincerely,        Rowdy Heck MD

## 2019-01-11 NOTE — PROGRESS NOTES
SUBJECTIVE:                                                                   Rafy Zepeda presents today to our Allergy Clinic at [facility] for a new patient visit.  As you know, he is a 28 year old male with a recurrent rash since November.  His mother set up an appointment for him mentioning that the patient is covered with hives head to toe.   It would last for 1 week, and then resolves. It started after moving to his house. They have septic water. Nobody else in the household has a similar rash.  A lot of stress for the last 10 months. He is in court in regards to custody of his child.   He denies angioedema, vomiting, nausea, diarrhea, wheezing, or rhinoconjunctivitis symptoms.  Patient Active Problem List   Diagnosis     CARDIOVASCULAR SCREENING; LDL GOAL LESS THAN 160     Generalized anxiety disorder     ADD (attention deficit disorder)     Head injury, closed, with LOC of unknown duration (H)       Past Medical History:   Diagnosis Date     Meningitis, unspecified(322.9) 10/1996      Problem (# of Occurrences) Relation (Name,Age of Onset)    Allergies (1) Mother    Anxiety Disorder (2) Mother, Sister    Asthma (1) Mother    Cancer (1) Paternal Grandmother: lung    Diabetes (1) Maternal Grandmother    Hypertension (2) Maternal Grandmother, Paternal Grandfather        Past Surgical History:   Procedure Laterality Date     HAND SURGERY Right 05/07/2018     NOSE SURGERY       Social History     Socioeconomic History     Marital status: Single     Spouse name: None     Number of children: None     Years of education: None     Highest education level: None   Social Needs     Financial resource strain: None     Food insecurity - worry: None     Food insecurity - inability: None     Transportation needs - medical: None     Transportation needs - non-medical: None   Occupational History     Occupation:    Tobacco Use     Smoking status: Current Some Day Smoker     Packs/day: 0.50      Types: Cigarettes     Smokeless tobacco: Former User     Types: Chew     Quit date: 11/1/2011     Tobacco comment: 1 pack per week   Substance and Sexual Activity     Alcohol use: Yes     Drug use: No     Sexual activity: Not Currently     Partners: Female     Birth control/protection: Condom   Other Topics Concern     Parent/sibling w/ CABG, MI or angioplasty before 65F 55M? No   Social History Narrative    January 11, 2019    ENVIRONMENTAL HISTORY: The family lives in a 22 year old home, [place] The home is heated with a forced air. They do have central air conditioning. The patient's bedroom is furnished with feather/wool bedding or pillows and carpeting in bedroom.  Pets inside the house include 3 dogs. 2 cats that are outside only. There is no history of cockroach or mice infestation. There are 4 smokers in the house.  The house does not have a damp basement.            Review of Systems   Constitutional: Negative for activity change, fatigue and fever.   HENT: Negative for congestion, dental problem, ear pain, facial swelling, nosebleeds, postnasal drip, rhinorrhea, sinus pressure and sneezing.    Eyes: Negative for discharge, redness and itching.   Respiratory: Negative for cough, chest tightness, shortness of breath and wheezing.    Cardiovascular: Negative for chest pain.   Gastrointestinal: Negative for diarrhea, nausea and vomiting.   Musculoskeletal: Negative for arthralgias, joint swelling and myalgias.   Skin: Positive for rash.   Neurological: Negative for headaches.   Hematological: Negative for adenopathy.   Psychiatric/Behavioral: Negative for behavioral problems and self-injury.           Current Outpatient Medications:      amphetamine-dextroamphetamine (ADDERALL) 30 MG tablet, TAKE 1 TABLET BY MOUTH EVERY A.M. AND 1 TAB AT 1 PM., Disp: , Rfl:      doxycycline hyclate (VIBRAMYCIN) 100 MG capsule, Take 1 capsule (100 mg) by mouth 2 times daily  "for 14 days, Disp: 28 capsule, Rfl: 0     venlafaxine (EFFEXOR-XR) 37.5 MG 24 hr capsule, Take 1 capsule (37.5 mg) by mouth 2 times daily, Disp: 30 capsule, Rfl:      meclizine (ANTIVERT) 25 MG tablet, Take 1 tablet (25 mg) by mouth every 6 hours as needed for dizziness (Patient not taking: Reported on 1/11/2019), Disp: 30 tablet, Rfl: 0  Immunization History   Administered Date(s) Administered     DTAP (<7y) 04/14/1997     HEPA 08/03/2006, 07/22/2010     HPV 11/03/2015     HepB 12/18/2003, 01/15/2004, 04/14/2004     Hib (PRP-T) 1992, 1992, 1992, 01/04/1994     Historical DTP/aP 1992, 1992, 1992, 01/04/1994     MMR 08/03/1993, 12/18/2003, 08/03/2006     Meningococcal (Menactra ) 08/03/2006     OPV, trivalent, live 1992, 1992, 01/09/1994, 04/14/1997     TDAP Vaccine (Adacel) 02/28/2016     Tdap (Adacel,Boostrix) 01/15/2004, 08/03/2006     Allergies   Allergen Reactions     Acetaminophen Nausea and Vomiting and Nausea     Ibuprofen Nausea     Sulfamethoxazole-Trimethoprim GI Disturbance     Other reaction(s): Gastrointestinal  Vomiting, nausea  Vomiting, nausea       Soap Rash     Unsure what kind of soap at this time     OBJECTIVE:                                                                 /83 (BP Location: Left arm, Patient Position: Sitting, Cuff Size: Adult Regular)   Pulse 90   Temp 97.4  F (36.3  C) (Oral)   Resp 16   Ht 1.956 m (6' 5\")   Wt 102 kg (224 lb 13.9 oz)   SpO2 97%   BMI 26.67 kg/m          Physical Exam   Constitutional: He is oriented to person, place, and time. No distress.   HENT:   Head: Normocephalic and atraumatic.   Right Ear: Tympanic membrane, external ear and ear canal normal.   Left Ear: Tympanic membrane, external ear and ear canal normal.   Nose: No mucosal edema or rhinorrhea.   Eyes: Conjunctivae are normal. Right eye exhibits no discharge. Left eye exhibits no discharge.   Neck: Normal range of motion.   Cardiovascular: " Normal rate, regular rhythm and normal heart sounds.   No murmur heard.  Pulmonary/Chest: Effort normal and breath sounds normal. No respiratory distress. He has no wheezes. He has no rales.   Musculoskeletal: Normal range of motion.   Lymphadenopathy:     He has no cervical adenopathy.   Neurological: He is alert and oriented to person, place, and time.   Skin: Skin is warm. He is not diaphoretic.   On the forehead, eyelids, nose, cheeks, forearms, multiple circular scabs in various stages of healing process. See pictures.   Nursing note and vitals reviewed.      ASSESSMENT/PLAN:         Visit Diagnoses     Rash    -  Primary  Current rash is not consistent with urticaria or angioedema.  Folliculitis versus constantly picking his skin due to stress.  -Prescribed doxycycline.  If he continues having symptoms, set up an appointment with Dermatology.  Referral provided.    Relevant Medications    doxycycline hyclate (VIBRAMYCIN) 100 MG capsule    Other Relevant Orders    DERMATOLOGY REFERRAL        Return if symptoms worsen or fail to improve.    Thank you for allowing us to participate in the care of this patient. Please feel free to contact us if there are any questions or concerns about the patient.    Disclaimer: This note consists of symbols derived from keyboarding, dictation and/or voice recognition software. As a result, there may be errors in the script that have gone undetected. Please consider this when interpreting information found in this chart.    Rowdy Heck MD, [facility]  Allergy, Asthma and Immunology  [place], [place] and [place]

## 2019-01-15 ASSESSMENT — ENCOUNTER SYMPTOMS
EYE DISCHARGE: 0
FEVER: 0
FACIAL SWELLING: 0
VOMITING: 0
SHORTNESS OF BREATH: 0
NAUSEA: 0
RHINORRHEA: 0
EYE REDNESS: 0
WHEEZING: 0
MYALGIAS: 0
EYE ITCHING: 0
FATIGUE: 0
CHEST TIGHTNESS: 0

## 2019-02-13 ENCOUNTER — HOSPITAL ENCOUNTER (OUTPATIENT)
Dept: OCCUPATIONAL THERAPY | Facility: CLINIC | Age: 29
Setting detail: THERAPIES SERIES
End: 2019-02-13
Attending: PLASTIC SURGERY
Payer: OTHER MISCELLANEOUS

## 2019-02-13 PROCEDURE — 97140 MANUAL THERAPY 1/> REGIONS: CPT | Mod: GO | Performed by: OCCUPATIONAL THERAPIST

## 2019-02-13 PROCEDURE — 97110 THERAPEUTIC EXERCISES: CPT | Mod: GO | Performed by: OCCUPATIONAL THERAPIST

## 2019-02-13 PROCEDURE — 97165 OT EVAL LOW COMPLEX 30 MIN: CPT | Mod: GO | Performed by: OCCUPATIONAL THERAPIST

## 2019-02-13 NOTE — PROGRESS NOTES
11/15/18   Note: This is a copy of patient's last daily visit and will also serve as their Discharge Summary as they have not been in for further treatment 30 days past this date. Final assessment of goals and physical and functional status , therefore unavailable.   Providers   Providers CAROL Vinson/L, CHT   Referring Physician Dr. Pizarro   Reporting Period   Reporting period from 09/14/18   Reporting period to 11/15/18   General Information   Rxs Authorized 19 ( 10/19/18)   Rxs Used 15 ( 6 since last progress note)   Medical Diagnosis Stiffness of right hand , Zone 2 flexor tendon lacerations to RSF, RRF, injury and repair on 6/27/18   Orders Evaluate And Treat As Indicated   Start Of Care Date 07/19/18   Onset date of current episode/exacerbation 05/07/18   Surgical procedure see above in history- do not have surgical report from initial surgeon at the moment but ptient will try to get   Date of surgery 06/27/18  (skin graft - initial surgery 5/7/18)   Date for Next MD Appointment (nothing scheduled)   Other pertinent information Having surgery in November 26th or 27th to release ring finger   Subjective Measures   Subjective hand doing fairly well. Keeping up with ex. Had some thumb pain last week.   Initial Pain level 9/10  (at best 4 at worst)   Current Pain level 0/10   Patient Reported % Functional Improvement 70 % UEFI 66 was 28   Functional Improvement Reported Work Activities;Leisure Activities;Self care activities;Gripping;Lifting;Carrying   Objective Measures   Objective Measures Edema;ROM;Strength;Objective Measure 1;Objective Measure 2   ROM   Location (anatomical) wrist   Location Right   Motion Ext/Flex   ROM Comments see chart for hand  and thumb- 70/75 was 60/60   Strength   Location Right;Left    82# ( was 28 initially) ,155#   Watters Pinch 18 Was 6 initially, 30   Lateral Pinch 18 was 8 initially, 30   Objective Measure 1   Objective Measure PIP extension ring   Details 55 was  42)   Modalities   Modalities Ultrasound   Ultrasound -Type Continuous;5 cm sound head   Skilled Interventions To Increase Tissue Extensibility;To Soften Scar Tissue   Intensity 1.0w/cm2   Frequency 3 MHz   Medication with extension stretch to ring finger   Therapeutic Exercise   Skilled Interventions (verbal and physical cuing needed)   PROM   PROM PROM Fingers;PROM Thumb;PROM Wrist  (gentle as above)   Special Techniques   Special Techniques TENDON GLIDING SPECIAL TECHNIQUES;BLOCKING SPECIAL TECHNIQUES   Tendon Gliding Hook fist;Table top;Full fist;Straight fist   Sets/Reps 1 set;10 reps   Blocking PIP;DIP   Sets/Reps 1 set;10 reps   Manual   Manual Scar Mob Position;MFR   Skilled Interventions To Increase Tissue Excursion;To Increase Tissue Extensibility;To Soften Scar Tissue   MFR Finger;other  (volar and dorsal hand and fingers)   Position Sitting   Description/ Technique moderate pressure, use of gua-sha.  passive stretch to ring and small fingers during volar release   Scar Mob Position Sitting   Scar Mob Location dorsal and volar hand/wrist   Description/ Technique circular;3 dimensions   Hand Goals   Hand Goals Household Chores;Dressing;Eating;Work   Eating   Current Functional Task Cutting food   Previous Performance Level Severe limitations   Current Performance Level Mild difficulty   Goal Target Task Cut food   Goal Target Performance Level (no problem)   Due Date 08/09/18   Date Goal Met 08/09/18   Dressing   Current Functional Task Snapping;Buttoning   Previous Performance Level Moderate limitations   Current Performance Level Mild difficulty   Goal Target Task Button shirt;Snap pants   Goal Target Performance Level Mild difficulty   Due Date 08/23/18   Date Goal Met 09/14/18   Household Chores   Current Functional Task Gripping   Previous Performance Level Independent   Current Performance Level Mild difficulty   Goal Target Task Open a tight or new jar   Goal Target Performance Level Mild difficulty    Due Date 09/05/18   Date Goal Met 08/09/18   Work   Current Functional Task Manipulating tools   Previous Performance Level Unable   Current Performance Level Moderate difficulty   Goal Target Task Hold and manipulate tools   Goal Target Performance Level Mild difficulty   Due Date 10/03/18   If goal not met, Why? wont meet until after next surgery   Assessment   Clinical Impression(s) Comments Patient plateued. Having surgery in 2 weeks.   Response to Therapy: Delcines in ROM   Response to Therapy: Improvements Pain;Self Care Skills   Equipment   Equipment elastome scar pad and coban, putty   Education   Learner Patient   Readiness Eager   Method Booklet/handout;Explanation;Demonstration   Response Verbalizes understanding;Demonstrates understanding   Education Notes see home program   Plan   Home program ex as above, scar massage,heat   Updates to plan of care strengthening   Plan Re- assess after next surgery and set new goals at that time.   Total Session Time   Roberta Stone OTR/L CHT  Occupational Therapist, Certified Hand Therapist

## 2019-02-13 NOTE — PROGRESS NOTES
02/13/19   Hand Therapy Evaluation   General Information/History   Start Of Care Date 02/13/19   Referring Physician Dr. Pizarro   Orders Evaluate And Treat As Indicated;Other   Other Orders right ring finger fds ( patient had FDS only right ring finger)   Orders Date 02/06/19   Medical Diagnosis POST Tenolysis right ring finger FDS Stiffness of right hand , Zone 2 flexor tendon lacerations to RSF, RRF, injury and repair on 6/27/18   Precautions/Limitations no lifting gripping   Additional Occupational Profile Info/Pertinent history of current problem See past eval on 7/19/18 for details of original work injury. Patient  most recently had Tenolysis on right ring finger. He reports that FDP ruptured during surgery. Patient initially wanted to come in for therapy but went in the ditch with the snow so was not able to get in and then had to go to ER due to hand swelling this past Sunday. He reports the swelling is significantly down.   Previous treatment or current condition none since most recent surgery.   Past medical history smoker 1/4 PPD   How/Where did it occur During contact with an object;At work   Onset date of current episode/exacerbation 05/07/18   Date of surgery 02/06/19  (most recent one.)   Surgical procedure Tenolysis right ring finger   Chronicity New   Hand Dominance Right   Affected side Right   Functional limitations perform activities of daily living;perform required work activities;perform desired leisure / sports activities   Reported Symptoms Pain;Loss of Motion/Stiffness;Loss of strength;Tingling;Numbness;Locking;Edema   Prior level of function Independent ADL;Independent IADL   Important Activities fishing, work out lift weights. play softball, kayaking, build things   Level of functions comments also has one year old son   Patient role/Employment history Employed   Occupation    Employment Status Currently not working due to present problem   Primary Job Tasks  Assembly;Pushing/pulling;Repetitive tasks;Lifting;Carrying;Gripping/pinching;Operating a machine;Reaching;Prolonged standing   Patient/Family goals statement Patient carol like to be able to  things with right hand again.   Fall Risk Screen   Fall screen completed by OT   Have you fallen 2 or more times in the past year? No   Have you fallen and had an injury in the past year? No   Is patient a fall risk? No   Pain   Pain Primary Pain Report   Primary Pain Report   Location entire right hand   Pain Quality Aching;Throbbing   Frequency Intermittent   Scale 1/10;9/10   Pain Is Worse At Night   Pain Is Exacerbated By Gripping;Activity/movement   Pain Is Relieved By Rest;Ice;Nsaids,analgesics   Progression Since Onset Gradually Improving   Edema   Edema Ring   Ring (measured in cm)   P1  - Left 6.8   P1  - Right 8.8   Distal Wrist Crease (measured in cm)   Distal Wrist Crease - - Left 18   Distal Wrist Crease - - Right 18.8   Skin Appearance   Skin Appearance Other (Comment)   Scar/Wound   Scar/Wound Comments ring finger scar - open some an oozing some. one stitch popper open.    AROM   AROM Ring;Wrist   Ring   MCP Extension - Right 0   MCP Flexion - Right 55   PIP Extension - Right 40   PIP Flexion - Right 55   DIP Extension - Left 0   DIP Flexion - Right ruptured FDP   Wrist   Wrist Extension - Right 65   Wrist Flexion - Right 75   Sensation Findings   Sensation Comments numb ring finger- no time to formally test today   Strength   Strength Other (see comments)   Strength Comments deferred   Education Assessment   Preferred Learning Style Demonstration   Barriers to Learning No barriers   Therapy Interventions   Planned Therapy Interventions Ultrasound;Light Therapy;Paraffin;Fluidotherapy;Strengthening;ROM;Stretching;Manual Therapy;Splinting;Education of splint wear, care, fit and precautions;Scar Management;Edema Management;Desensitization;Self Care/Home Management;Home Program   Therapy plan comments To be added  as appropriate   Clinical Impression   Criteria for Skilled Therapeutic Interventions Met yes   OT Diagnosis Decreased ADL's   Influenced by the following impairments Pain;Edema;Decreased range of motion;Decreased strength;Impaired sensation   Assessment of Occupational Performance 5 or more Performance Deficits   Identified Performance Deficits writing, lifting, carrying, hobbies, sports, lifting groceries, washing hair, weight bearing, preparing food, dressing, using tools, opening jars , throwing ball   Clinical Decision Making (Complexity) Moderate complexity   Therapy Frequency 3x/week   Predicted Duration of Therapy Intervention (days/wks) 6 weeks   Risks and Benefits of Treatment have been explained. Yes   Patient, Family & other staff in agreement with plan of care Yes   Clinical Impression Comments Patient presents with severe stiffness post tenolysis right dominant ring finger. Anticipate patient will benefit from skilled Hand Therapy to meet functional goals.   Eating   Current Functional Task Cutting food   Previous Performance Level Independent   Current Performance Level Mild difficulty;Severe difficulty;Limited   Goal Target Task Cut food; knife to cut   Goal Target Performance Level No difficulty   Due Date 04/12/19   Household Chores   Current Functional Task Gripping   Previous Performance Level Independent   Current Performance Level Severe difficulty   Goal Target Task Open a tight or new jar   Goal Target Performance Level No difficulty   Due Date 04/13/19   If goal not met, Why? STG: Moderate by 3/15/19   Work   Current Functional Task Manipulating tools   Previous Performance Level Independent   Current Performance Level Unable   Goal Target Task Hold and manipulate tools   Goal Target Performance Level Mild difficulty   Due Date 04/13/19   If goal not met, Why? STG: moderate by 3/25/19   Total Evaluation Time   CAROL Vinson/L CHT  Occupational Therapist, Certified Hand Therapist

## 2019-02-15 ENCOUNTER — HOSPITAL ENCOUNTER (OUTPATIENT)
Dept: OCCUPATIONAL THERAPY | Facility: CLINIC | Age: 29
Setting detail: THERAPIES SERIES
End: 2019-02-15
Attending: PLASTIC SURGERY
Payer: OTHER MISCELLANEOUS

## 2019-02-15 PROCEDURE — 97110 THERAPEUTIC EXERCISES: CPT | Mod: GO | Performed by: OCCUPATIONAL THERAPIST

## 2019-02-15 PROCEDURE — 97140 MANUAL THERAPY 1/> REGIONS: CPT | Mod: GO | Performed by: OCCUPATIONAL THERAPIST

## 2019-02-22 ENCOUNTER — HOSPITAL ENCOUNTER (OUTPATIENT)
Dept: OCCUPATIONAL THERAPY | Facility: CLINIC | Age: 29
Setting detail: THERAPIES SERIES
End: 2019-02-22
Attending: PLASTIC SURGERY
Payer: OTHER MISCELLANEOUS

## 2019-02-22 PROCEDURE — 97110 THERAPEUTIC EXERCISES: CPT | Mod: GO | Performed by: OCCUPATIONAL THERAPIST

## 2019-02-22 PROCEDURE — 97140 MANUAL THERAPY 1/> REGIONS: CPT | Mod: GO | Performed by: OCCUPATIONAL THERAPIST

## 2019-02-28 ENCOUNTER — HOSPITAL ENCOUNTER (OUTPATIENT)
Dept: OCCUPATIONAL THERAPY | Facility: CLINIC | Age: 29
Setting detail: THERAPIES SERIES
End: 2019-02-28
Attending: PLASTIC SURGERY
Payer: OTHER MISCELLANEOUS

## 2019-02-28 PROCEDURE — 97110 THERAPEUTIC EXERCISES: CPT | Mod: GO | Performed by: OCCUPATIONAL THERAPIST

## 2019-02-28 PROCEDURE — 97140 MANUAL THERAPY 1/> REGIONS: CPT | Mod: GO | Performed by: OCCUPATIONAL THERAPIST

## 2019-03-06 ENCOUNTER — HOSPITAL ENCOUNTER (OUTPATIENT)
Dept: OCCUPATIONAL THERAPY | Facility: CLINIC | Age: 29
Setting detail: THERAPIES SERIES
End: 2019-03-06
Attending: PLASTIC SURGERY
Payer: OTHER MISCELLANEOUS

## 2019-03-06 PROCEDURE — 97110 THERAPEUTIC EXERCISES: CPT | Mod: GO | Performed by: OCCUPATIONAL THERAPIST

## 2019-03-06 PROCEDURE — 97140 MANUAL THERAPY 1/> REGIONS: CPT | Mod: GO | Performed by: OCCUPATIONAL THERAPIST

## 2019-03-08 ENCOUNTER — HOSPITAL ENCOUNTER (OUTPATIENT)
Dept: OCCUPATIONAL THERAPY | Facility: CLINIC | Age: 29
Setting detail: THERAPIES SERIES
End: 2019-03-08
Attending: PLASTIC SURGERY
Payer: OTHER MISCELLANEOUS

## 2019-03-08 PROCEDURE — 97140 MANUAL THERAPY 1/> REGIONS: CPT | Mod: GO | Performed by: OCCUPATIONAL THERAPIST

## 2019-03-08 PROCEDURE — 97110 THERAPEUTIC EXERCISES: CPT | Mod: GO | Performed by: OCCUPATIONAL THERAPIST

## 2019-03-13 ENCOUNTER — HOSPITAL ENCOUNTER (OUTPATIENT)
Dept: OCCUPATIONAL THERAPY | Facility: CLINIC | Age: 29
Setting detail: THERAPIES SERIES
End: 2019-03-13
Attending: PLASTIC SURGERY
Payer: OTHER MISCELLANEOUS

## 2019-03-13 PROCEDURE — 97110 THERAPEUTIC EXERCISES: CPT | Mod: GO | Performed by: OCCUPATIONAL THERAPIST

## 2019-03-13 PROCEDURE — 97140 MANUAL THERAPY 1/> REGIONS: CPT | Mod: GO | Performed by: OCCUPATIONAL THERAPIST

## 2019-03-13 PROCEDURE — 97760 ORTHOTIC MGMT&TRAING 1ST ENC: CPT | Mod: GO | Performed by: OCCUPATIONAL THERAPIST

## 2019-03-15 ENCOUNTER — HOSPITAL ENCOUNTER (OUTPATIENT)
Dept: OCCUPATIONAL THERAPY | Facility: CLINIC | Age: 29
Setting detail: THERAPIES SERIES
End: 2019-03-15
Attending: PLASTIC SURGERY
Payer: OTHER MISCELLANEOUS

## 2019-03-15 PROCEDURE — 97035 APP MDLTY 1+ULTRASOUND EA 15: CPT | Mod: GO | Performed by: OCCUPATIONAL THERAPIST

## 2019-03-15 PROCEDURE — 97760 ORTHOTIC MGMT&TRAING 1ST ENC: CPT | Mod: GO | Performed by: OCCUPATIONAL THERAPIST

## 2019-03-15 PROCEDURE — 97140 MANUAL THERAPY 1/> REGIONS: CPT | Mod: GO | Performed by: OCCUPATIONAL THERAPIST

## 2019-03-15 PROCEDURE — 97110 THERAPEUTIC EXERCISES: CPT | Mod: GO | Performed by: OCCUPATIONAL THERAPIST

## 2019-03-22 ENCOUNTER — HOSPITAL ENCOUNTER (OUTPATIENT)
Dept: OCCUPATIONAL THERAPY | Facility: CLINIC | Age: 29
Setting detail: THERAPIES SERIES
End: 2019-03-22
Attending: PLASTIC SURGERY
Payer: OTHER MISCELLANEOUS

## 2019-03-22 PROCEDURE — 97035 APP MDLTY 1+ULTRASOUND EA 15: CPT | Mod: GO | Performed by: OCCUPATIONAL THERAPIST

## 2019-03-22 PROCEDURE — 97140 MANUAL THERAPY 1/> REGIONS: CPT | Mod: GO | Performed by: OCCUPATIONAL THERAPIST

## 2019-03-22 PROCEDURE — 97110 THERAPEUTIC EXERCISES: CPT | Mod: GO | Performed by: OCCUPATIONAL THERAPIST

## 2019-04-05 ENCOUNTER — HOSPITAL ENCOUNTER (OUTPATIENT)
Dept: OCCUPATIONAL THERAPY | Facility: CLINIC | Age: 29
Setting detail: THERAPIES SERIES
End: 2019-04-05
Attending: PLASTIC SURGERY
Payer: OTHER MISCELLANEOUS

## 2019-04-05 PROCEDURE — 97110 THERAPEUTIC EXERCISES: CPT | Mod: GO | Performed by: OCCUPATIONAL THERAPIST

## 2019-04-05 PROCEDURE — 97140 MANUAL THERAPY 1/> REGIONS: CPT | Mod: GO | Performed by: OCCUPATIONAL THERAPIST

## 2019-04-05 NOTE — PROGRESS NOTES
04/05/19 0500   Notes   Note Type Progress Note   Providers   Providers Roberta Stone, OTR/L, CHT   Referring Physician Dr. Pizarro   Reporting Period   Reporting period from 02/13/19   Reporting period to 04/05/19   General Information   Rxs Authorized 20 ( 5-13-19)   Rxs Used 10   Medical Diagnosis POST Tenlolysis right ring finger FDSStiffness of right hand , Zone 2 flexor tendon lacerations to RSF, RRF, injury and repair on 6/27/18   Orders Evaluate And Treat As Indicated;Other   Other Orders right ring finger fds ( patient had FDS only right ring finger)   Start Of Care Date 02/13/19   Onset date of current episode/exacerbation 05/07/18   Surgical procedure Tenolysis right ring finger   Date of surgery 02/06/19  (most recent one.)   Date for Next MD Appointment 03/29/19   Precautions/Limitations no lifting gripping   Subjective Measures   Subjective feels stiff like it is not moving anymore. Overall is happy that finger is better than it was before surgery but was hooping he would have more movement out of it.   Initial Pain level 9/10  (worst 1 best)   Current Pain level 7/10  (at worst 1 at best)   Patient Reported % Functional Improvement UEFI 70 was 30   Functional Improvement Reported Work Activities;Leisure Activities;Self care activities;Gripping;Prehension;Reaching;Lifting;Carrying   Edema   Location (anatomical) P1 ring   Location Right   Edema Comments 7.5 was 8.8   ROM   Location (anatomical) ring MP,PIP   Location Right   Motion Ext/Flex   ROM Comments 45/65 was 40/55   Strength   Location Right;Left    55,145   Objective Measure 1   Objective Measure scar   Details still very tight and hard   Objective Measure 2   Objective Measure Active compostie flexion ring   Details 7 cm to DPC pre   Therapeutic Exercise   Therapeutic Exercise Other Exercises/Activities   Skilled Interventions (verbal and physical cuing needed)   Therapeutic Procedure: strength, endurance, ROM, flexibillity minutes  (84012) 10   ROM/AROM   Sets/Reps 1 set;5 reps   AROM Intrinsics All Planes   Sets/Reps 1 set;10 reps;reviewed;HEP   AROM Thumb All Planes   Sets/Reps 1 set;10 reps;reviewed;HEP   AROM Fingers Flexion;Extension   Other Exer/Activities/Educ   Exercise 1 PROM   Description 1 MP- PIP in flexion and ext and compositie as tolerated   Exercise 2 AROM- Tendon gliding, blocking   Description 2 Place and hold ex   Exercise 3 x 10 x 2   Special Techniques   Sets/Reps 1 set;5 reps;HEP;reviewed   Blocking PIP;DIP   Sets/Reps 1 set;10 reps   Special Techniques TENDON GLIDING SPECIAL TECHNIQUES;BLOCKING SPECIAL TECHNIQUES   Manual   Manual Scar Mob Position;MFR   Manual Therapy Minutes (53259) 20   Skilled Interventions To Increase Tissue Excursion;To Increase Tissue Extensibility;To Soften Scar Tissue   MFR Finger;other;Thenars  (hand)   Position Sitting   Description/ Technique moderate pressure; use of gua-sha to dorsum of hand as well as med/lat aspects of ring and small   Scar Mob Position Sitting   Scar Mob Location around scar, gently over scar; use of dycem for HEP   Description/ Technique circular;3 dimensions   Eating   Current Functional Task Cutting food   Previous Performance Level Independent   Current Performance Level Mild difficulty   Goal Target Task Cut food; knife to cut   Goal Target Performance Level No difficulty   Due Date 04/26/19   If goal not met, Why? progressing toward   Household Chores   Current Functional Task Gripping   Previous Performance Level Independent   Current Performance Level Mild difficulty   Goal Target Task Open a tight or new jar   Goal Target Performance Level No difficulty   Due Date 06/13/19   If goal not met, Why? STG: Moderate by 3/15/19- met   Work   Current Functional Task Manipulating tools   Previous Performance Level Unable   Current Performance Level Moderate difficulty   Goal Target Task Hold and manipulate tools   Goal Target Performance Level Mild difficulty   Due  Date 04/13/19   If goal not met, Why? STG: moderate by 3/25/19- met   Assessment   Clinical Impression(s) Comments Patient has met short term goals and is functionally much better , however tendon glide of digit 4 is poor.   Response to Therapy: Delcines in ROM   Response to Therapy: Improvements ROM;Flexibility   Education   Learner Patient   Readiness Eager   Method Booklet/handout;Explanation;Demonstration   Response Verbalizes understanding;Demonstrates understanding   Education Notes see home program   Plan   Homework trying to keep scar and skin moist   Home program ex as above   Plan Continue another month of therapy 2x/week to improve scar and mobility for enhanced function.   Total Session Time   Roberta Stone OTR/L CHT  Occupational Therapist, Certified Hand Therapist

## 2019-06-14 NOTE — PROGRESS NOTES
04/05/19 Note: This is a copy of patient's last daily visit and will also serve as their Discharge Summary as they have not been in for further treatment 30 days past this date. Final assessment of goals and physical and functional status , therefore unavailable.     Notes   Note Type Progress Note   Providers   Providers CAROL Vinson/L, CHT   Referring Physician Dr. Pizarro   Reporting Period   Reporting period from 02/13/19   Reporting period to 04/05/19   General Information   Rxs Authorized 20 ( 5-13-19)   Rxs Used 10   Medical Diagnosis  POST Tenlolysis right ring finger FDSStiffness of right hand , Zone 2 flexor tendon lacerations to RSF, RRF, injury and repair on 6/27/18   Orders Evaluate And Treat As Indicated;Other   Other Orders right ring finger fds ( patient had FDS only right ring finger)   Start Of Care Date 02/13/19   Onset date of current episode/exacerbation 05/07/18   Surgical procedure Tenolysis right ring finger   Date of surgery 02/06/19  (most recent one.)   Date for Next MD Appointment 03/29/19   Precautions/Limitations no lifting gripping   Subjective Measures   Subjective feels stiff like it is not moving anymore. Overall is happy that finger is better than it was before surgery but was hooping he would have more movement out of it.   Initial Pain level 9/10  (worst 1 best)   Current Pain level 7/10  (at worst 1 at best)   Patient Reported % Functional Improvement UEFI 70 was 30   Functional Improvement Reported Work Activities;Leisure Activities;Self care activities;Gripping;Prehension;Reaching;Lifting;Carrying   Edema   Location (anatomical) P1 ring   Location Right   Edema Comments 7.5 was 8.8   ROM   Location (anatomical) ring MP,PIP   Location Right   Motion Ext/Flex   ROM Comments 45/65 was 40/55   Strength   Location Right;Left    55,145   Objective Measure 1   Objective Measure scar   Details still very tight and hard   Objective Measure 2   Objective Measure Active  compostie flexion ring   Details 7 cm to DPC pre   Therapeutic Exercise   Therapeutic Exercise Other Exercises/Activities   Skilled Interventions   (verbal and physical cuing needed)   Therapeutic Procedure: strength, endurance, ROM, flexibillity minutes (32515) 10   ROM/AROM   Sets/Reps 1 set;5 reps   AROM Intrinsics All Planes   Sets/Reps 1 set;10 reps;reviewed;HEP   AROM Thumb All Planes   Sets/Reps 1 set;10 reps;reviewed;HEP   AROM Fingers Flexion;Extension   Other Exer/Activities/Educ   Exercise 1 PROM   Description 1 MP- PIP in flexion and ext and compositie as tolerated   Exercise 2 AROM- Tendon gliding, blocking   Description 2 Place and hold ex   Exercise 3 x 10 x 2   Special Techniques   Sets/Reps 1 set;5 reps;HEP;reviewed   Blocking PIP;DIP   Sets/Reps 1 set;10 reps   Special Techniques TENDON GLIDING SPECIAL TECHNIQUES;BLOCKING SPECIAL TECHNIQUES   Manual   Manual Scar Mob Position;MFR   Manual Therapy Minutes (95583) 20   Skilled Interventions To Increase Tissue Excursion;To Increase Tissue Extensibility;To Soften Scar Tissue   MFR Finger;other;Thenars  (hand)   Position Sitting   Description/ Technique moderate pressure; use of gua-sha to dorsum of hand as well as med/lat aspects of ring and small   Scar Mob Position Sitting   Scar Mob Location around scar, gently over scar; use of dycem for HEP   Description/ Technique circular;3 dimensions   Eating   Current Functional Task Cutting food   Previous Performance Level Independent   Current Performance Level Mild difficulty   Goal Target Task Cut food; knife to cut   Goal Target Performance Level No difficulty   Due Date 04/26/19   If goal not met, Why? progressing toward   Household Chores   Current Functional Task Gripping   Previous Performance Level Independent   Current Performance Level Mild difficulty   Goal Target Task Open a tight or new jar   Goal Target Performance Level No difficulty   Due Date 06/13/19   If goal not met, Why? STG:  Moderate by 3/15/19- met   Work   Current Functional Task Manipulating tools   Previous Performance Level Unable   Current Performance Level Moderate difficulty   Goal Target Task Hold and manipulate tools   Goal Target Performance Level Mild difficulty   Due Date 04/13/19   If goal not met, Why? STG: moderate by 3/25/19- met   Assessment   Clinical Impression(s) Comments Patient has met short term goals and is functionally much better , however tendon glide of digit 4 is poor.   Response to Therapy: Delcines in ROM   Response to Therapy: Improvements ROM;Flexibility   Education   Learner Patient   Readiness Eager   Method Booklet/handout;Explanation;Demonstration   Response Verbalizes understanding;Demonstrates understanding   Education Notes see home program   Plan   Homework trying to keep scar and skin moist   Home program ex as above   Plan Continue another month of therapy 2x/week to improve scar and mobility for enhanced function.   Total Session Time   Roberta Stone OTR/L CHT  Occupational Therapist, Certified Hand Therapist

## 2019-06-14 NOTE — ADDENDUM NOTE
Encounter addended by: Roberta Stone OT on: 6/14/2019 8:16 AM   Actions taken: Sign clinical note, Flowsheet accepted, Episode resolved

## 2020-03-02 ENCOUNTER — HOSPITAL ENCOUNTER (EMERGENCY)
Facility: CLINIC | Age: 30
Discharge: HOME | End: 2020-03-02
Attending: FAMILY MEDICINE | Admitting: FAMILY MEDICINE

## 2020-03-02 VITALS
TEMPERATURE: 98.1 F | HEIGHT: 77 IN | BODY MASS INDEX: 27.16 KG/M2 | SYSTOLIC BLOOD PRESSURE: 143 MMHG | WEIGHT: 230 LBS | DIASTOLIC BLOOD PRESSURE: 86 MMHG | RESPIRATION RATE: 16 BRPM | OXYGEN SATURATION: 98 %

## 2020-03-02 DIAGNOSIS — K08.89 PAIN, DENTAL: ICD-10-CM

## 2020-03-02 PROCEDURE — 25000132 ZZH RX MED GY IP 250 OP 250 PS 637: Performed by: FAMILY MEDICINE

## 2020-03-02 PROCEDURE — 99283 EMERGENCY DEPT VISIT LOW MDM: CPT

## 2020-03-02 PROCEDURE — 99284 EMERGENCY DEPT VISIT MOD MDM: CPT | Mod: Z6 | Performed by: FAMILY MEDICINE

## 2020-03-02 RX ORDER — PENICILLIN V POTASSIUM 500 MG/1
500 TABLET, FILM COATED ORAL 4 TIMES DAILY
Qty: 40 TABLET | Refills: 0 | Status: SHIPPED | OUTPATIENT
Start: 2020-03-02 | End: 2020-03-12

## 2020-03-02 RX ORDER — PENICILLIN V POTASSIUM 250 MG/1
500 TABLET, FILM COATED ORAL ONCE
Status: COMPLETED | OUTPATIENT
Start: 2020-03-02 | End: 2020-03-02

## 2020-03-02 RX ORDER — OXYCODONE HYDROCHLORIDE 5 MG/1
5 TABLET ORAL EVERY 4 HOURS PRN
Status: DISCONTINUED | OUTPATIENT
Start: 2020-03-02 | End: 2020-03-03 | Stop reason: HOSPADM

## 2020-03-02 RX ORDER — OXYCODONE HYDROCHLORIDE 5 MG/1
5 TABLET ORAL EVERY 6 HOURS PRN
Qty: 12 TABLET | Refills: 0 | Status: SHIPPED | OUTPATIENT
Start: 2020-03-02

## 2020-03-02 RX ADMIN — PENICILLIN V POTASSIUM 500 MG: 250 TABLET, FILM COATED ORAL at 23:45

## 2020-03-02 RX ADMIN — OXYCODONE HYDROCHLORIDE 5 MG: 5 TABLET ORAL at 23:45

## 2020-03-02 ASSESSMENT — MIFFLIN-ST. JEOR: SCORE: 2135.65

## 2020-03-02 NOTE — ED AVS SNAPSHOT
[facility] Emergency Department  [address] [address]  Phone:  [phone]  Fax:  [phone]                                    Rafy Zepeda   MRN: [id number]    Department:  [facility] Emergency Department   Date of Visit:  3/2/2020           After Visit Summary Signature Page    I have received my discharge instructions, and my questions have been answered. I have discussed any challenges I see with this plan with the nurse or doctor.    ..........................................................................................................................................  Patient/Patient Representative Signature      ..........................................................................................................................................  Patient Representative Print Name and Relationship to Patient    ..................................................               ................................................  Date                                   Time    ..........................................................................................................................................  Reviewed by Signature/Title    ...................................................              ..............................................  Date                                               Time          22EPIC Rev 08/18

## 2020-03-03 NOTE — ED PROVIDER NOTES
History     Chief Complaint   Patient presents with     Dental Pain     seen by dentist this AM, lower right tooth pain     HPI  Rafy Zepeda is a 29 year old male, past medical history is significant for ADD, generalized anxiety disorder, presents the emergency department with concerns of dental pain after being seen by dentist this morning.  History is obtained from the patient who was brought in by a family member with concerns of right mandibular area dental pain after undergoing the beginning/partial root canal this morning by a dentist in [facility].  He states that after the Novocain wore off this afternoon he began experiencing significant pain despite the use of Tylenol and ibuprofen as directed by dentistry.  He states that they told him that he was to receive an antibiotic as well as pain medications but apparently this was for some reason not prescribed for him.  He called the dental hotline [facility] for the dentistry clinic of record and was not able to discuss with the provider.  He therefore came to the emergency department.    Allergies:  Allergies   Allergen Reactions     Acetaminophen Nausea and Vomiting and Nausea     Ibuprofen Nausea     Sulfamethoxazole-Trimethoprim GI Disturbance     Other reaction(s): Gastrointestinal  Vomiting, nausea  Vomiting, nausea       Soap Rash     Unsure what kind of soap at this time       Problem List:    Patient Active Problem List    Diagnosis Date Noted     Head injury, closed, with LOC of unknown duration (H) 02/28/2016     Priority: Medium     ADD (attention deficit disorder) 11/05/2015     Priority: Medium     November 6, 2015  URINE TOX SCREEN NOT AS EXPECTED NO FURTHER ADDERALL THROUGH THIS OFFICE - [facility] Campa [facility]       Generalized anxiety disorder 06/02/2014     Priority: Medium     Diagnosis updated by automated process. Provider to review and confirm.       CARDIOVASCULAR SCREENING; LDL GOAL LESS THAN 160 06/21/2012     Priority: Medium     "    Past Medical History:    Past Medical History:   Diagnosis Date     Meningitis, unspecified(322.9) 10/1996       Past Surgical History:    Past Surgical History:   Procedure Laterality Date     HAND SURGERY Right 05/07/2018     NOSE SURGERY         Family History:    Family History   Problem Relation Age of Onset     Diabetes Maternal Grandmother      Hypertension Maternal Grandmother      Cancer Paternal Grandmother         lung     Hypertension Paternal Grandfather      Anxiety Disorder Mother      Asthma Mother      Allergies Mother      Anxiety Disorder Sister        Social History:  Marital Status:  Single [1]  Social History     Tobacco Use     Smoking status: Current Some Day Smoker     Packs/day: 0.50     Types: Cigarettes     Smokeless tobacco: Former User     Types: Chew     Quit date: 11/1/2011     Tobacco comment: 1 pack per week   Substance Use Topics     Alcohol use: Yes     Drug use: No        Medications:    oxyCODONE (ROXICODONE) 5 MG tablet  penicillin V (VEETID) 500 MG tablet  amphetamine-dextroamphetamine (ADDERALL) 30 MG tablet  meclizine (ANTIVERT) 25 MG tablet  venlafaxine (EFFEXOR-XR) 37.5 MG 24 hr capsule          Review of Systems   All other systems reviewed and are negative.      Physical Exam   BP: (!) 143/86  Heart Rate: 89  Temp: 98.1  F (36.7  C)  Resp: 16  Height: 195.6 cm (6' 5\")  Weight: 104.3 kg (230 lb)  SpO2: 98 %      Physical Exam  Vitals signs and nursing note reviewed.   Constitutional:       General: He is in acute distress.      Appearance: Normal appearance. He is normal weight.      Comments: Appears uncomfortable due to pain.   HENT:      Head: Normocephalic and atraumatic.      Right Ear: Tympanic membrane normal.      Left Ear: Tympanic membrane normal.      Mouth/Throat:     Neck:      Musculoskeletal: Normal range of motion and neck supple.   Cardiovascular:      Rate and Rhythm: Normal rate and regular rhythm.   Neurological:      Mental Status: He is alert. "         ED Course        Procedures               Critical Care time:  none               No results found for this or any previous visit (from the past 24 hour(s)).    Medications   oxyCODONE (ROXICODONE) tablet 5 mg (5 mg Oral Given 3/2/20 2345)   penicillin V (VEETID) tablet 500 mg (500 mg Oral Given 3/2/20 2345)       Assessments & Plan (with Medical Decision Making)   29-year-old male past medical history reviewed as above who presents after dental procedure earlier today with concerns of inadequate pain control as described in the HPI.  Physical exam of the oral cavity is consistent with the recently described procedure in a temporary filling.  We discussed continuing with Tylenol and ibuprofen and I have added oxycodone for pain relief.  I have also placed him on oral antibiotics and requested that he call his dentist tomorrow for further direction.  All questions were answered disposition is to home.    Disclaimer: This note consists of symbols derived from keyboarding, dictation and/or voice recognition software. As a result, there may be errors in the script that have gone undetected. Please consider this when interpreting information found in this chart.      I have reviewed the nursing notes.    I have reviewed the findings, diagnosis, plan and need for follow up with the patient.          Discharge Medication List as of 3/2/2020 11:42 PM      START taking these medications    Details   oxyCODONE (ROXICODONE) 5 MG tablet Take 1 tablet (5 mg) by mouth every 6 hours as needed for breakthrough pain, Disp-12 tablet, R-0, Local Print      penicillin V (VEETID) 500 MG tablet Take 1 tablet (500 mg) by mouth 4 times daily for 10 days, Disp-40 tablet, R-0, Local Print             Final diagnoses:   Pain, dental       3/2/2020   [facility] EMERGENCY DEPARTMENT     Luis Nieto MD  03/02/20 7420

## 2020-03-03 NOTE — ED NOTES
Presents to ED with c/o lower right dental pain.  States that he had the start of a root canal today and was supposed to be prescribed abx and pain meds.  Last took ibuprofen and tylenol at 2130.

## 2020-03-03 NOTE — DISCHARGE INSTRUCTIONS
Ibuprofen 800 mg 3 times daily with food.  Maximum 2400 mg in 24 hours.  Tylenol 1 g every 4 hours as needed for pain.  Maximum 4 g in 24 hours.  Oxycodone for breakthrough pain as directed.  Penicillin as directed.  Please call your dentist tomorrow for further direction and to advise them that you were seen in the emergency department.

## 2020-07-12 ENCOUNTER — APPOINTMENT (OUTPATIENT)
Dept: CT IMAGING | Facility: CLINIC | Age: 30
End: 2020-07-12
Attending: EMERGENCY MEDICINE

## 2020-07-12 ENCOUNTER — HOSPITAL ENCOUNTER (EMERGENCY)
Facility: CLINIC | Age: 30
Discharge: HOME | End: 2020-07-12
Attending: EMERGENCY MEDICINE | Admitting: EMERGENCY MEDICINE

## 2020-07-12 VITALS
TEMPERATURE: 98.3 F | OXYGEN SATURATION: 96 % | WEIGHT: 240 LBS | HEIGHT: 78 IN | BODY MASS INDEX: 27.77 KG/M2 | DIASTOLIC BLOOD PRESSURE: 76 MMHG | RESPIRATION RATE: 18 BRPM | SYSTOLIC BLOOD PRESSURE: 138 MMHG
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
## 2020-07-12 DIAGNOSIS — S16.1XXA CERVICAL STRAIN, INITIAL ENCOUNTER: ICD-10-CM

## 2020-07-12 DIAGNOSIS — S39.012A BACK STRAIN, INITIAL ENCOUNTER: ICD-10-CM

## 2020-07-12 PROCEDURE — 99284 EMERGENCY DEPT VISIT MOD MDM: CPT | Mod: Z6 | Performed by: EMERGENCY MEDICINE

## 2020-07-12 PROCEDURE — 72131 CT LUMBAR SPINE W/O DYE: CPT

## 2020-07-12 PROCEDURE — 99285 EMERGENCY DEPT VISIT HI MDM: CPT | Mod: 25 | Performed by: EMERGENCY MEDICINE

## 2020-07-12 PROCEDURE — 72128 CT CHEST SPINE W/O DYE: CPT

## 2020-07-12 PROCEDURE — 25000128 H RX IP 250 OP 636: Performed by: EMERGENCY MEDICINE

## 2020-07-12 PROCEDURE — 72125 CT NECK SPINE W/O DYE: CPT

## 2020-07-12 PROCEDURE — 96372 THER/PROPH/DIAG INJ SC/IM: CPT | Performed by: EMERGENCY MEDICINE

## 2020-07-12 RX ORDER — KETOROLAC TROMETHAMINE 30 MG/ML
60 INJECTION, SOLUTION INTRAMUSCULAR; INTRAVENOUS ONCE
Status: COMPLETED | OUTPATIENT
Start: 2020-07-12 | End: 2020-07-12

## 2020-07-12 RX ORDER — HYDROCODONE BITARTRATE AND ACETAMINOPHEN 5; 325 MG/1; MG/1
1-2 TABLET ORAL EVERY 4 HOURS PRN
Qty: 12 TABLET | Refills: 0 | Status: SHIPPED | OUTPATIENT
Start: 2020-07-12

## 2020-07-12 RX ORDER — CYCLOBENZAPRINE HCL 10 MG
10 TABLET ORAL 3 TIMES DAILY PRN
Qty: 20 TABLET | Refills: 0 | Status: SHIPPED | OUTPATIENT
Start: 2020-07-12 | End: 2020-07-19

## 2020-07-12 RX ADMIN — KETOROLAC TROMETHAMINE 60 MG: 30 INJECTION, SOLUTION INTRAMUSCULAR at 06:33

## 2020-07-12 ASSESSMENT — MIFFLIN-ST. JEOR: SCORE: 2191.88
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
## 2020-07-12 NOTE — ED AVS SNAPSHOT
[facility] Emergency Department  [address] [address]  Phone:  [phone]  Fax:  [phone]                                    Rafy Zepeda   MRN: [id number]    Department:  [facility] Emergency Department   Date of Visit:  7/12/2020           After Visit Summary Signature Page    I have received my discharge instructions, and my questions have been answered. I have discussed any challenges I see with this plan with the nurse or doctor.    ..........................................................................................................................................  Patient/Patient Representative Signature      ..........................................................................................................................................  Patient Representative Print Name and Relationship to Patient    ..................................................               ................................................  Date                                   Time    ..........................................................................................................................................  Reviewed by Signature/Title    ...................................................              ..............................................  Date                                               Time          22EPIC Rev 08/18
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
## 2021-10-14 ENCOUNTER — APPOINTMENT (OUTPATIENT)
Dept: GENERAL RADIOLOGY | Facility: CLINIC | Age: 31
End: 2021-10-14
Attending: PHYSICIAN ASSISTANT

## 2021-10-14 ENCOUNTER — HOSPITAL ENCOUNTER (EMERGENCY)
Facility: CLINIC | Age: 31
Discharge: HOME | End: 2021-10-14
Attending: PHYSICIAN ASSISTANT | Admitting: PHYSICIAN ASSISTANT

## 2021-10-14 VITALS
SYSTOLIC BLOOD PRESSURE: 124 MMHG | RESPIRATION RATE: 18 BRPM | HEART RATE: 116 BPM | BODY MASS INDEX: 25.45 KG/M2 | DIASTOLIC BLOOD PRESSURE: 70 MMHG | OXYGEN SATURATION: 94 % | HEIGHT: 78 IN | WEIGHT: 220 LBS | TEMPERATURE: 97 F

## 2021-10-14 DIAGNOSIS — J18.9 PNEUMONIA: ICD-10-CM

## 2021-10-14 LAB
AMPHETAMINES UR QL SCN: ABNORMAL
ANION GAP SERPL CALCULATED.3IONS-SCNC: 3 MMOL/L (ref 3–14)
BARBITURATES UR QL: ABNORMAL
BASOPHILS # BLD AUTO: 0 10E3/UL (ref 0–0.2)
BASOPHILS NFR BLD AUTO: 1 %
BENZODIAZ UR QL: ABNORMAL
BUN SERPL-MCNC: 15 MG/DL (ref 7–30)
CALCIUM SERPL-MCNC: 8.9 MG/DL (ref 8.5–10.1)
CANNABINOIDS UR QL SCN: ABNORMAL
CHLORIDE BLD-SCNC: 109 MMOL/L (ref 94–109)
CO2 SERPL-SCNC: 28 MMOL/L (ref 20–32)
COCAINE UR QL: ABNORMAL
CREAT SERPL-MCNC: 0.85 MG/DL (ref 0.66–1.25)
EOSINOPHIL # BLD AUTO: 0.1 10E3/UL (ref 0–0.7)
EOSINOPHIL NFR BLD AUTO: 1 %
ERYTHROCYTE [DISTWIDTH] IN BLOOD BY AUTOMATED COUNT: 11.4 % (ref 10–15)
GFR SERPL CREATININE-BSD FRML MDRD: >90 ML/MIN/1.73M2
GLUCOSE BLD-MCNC: 84 MG/DL (ref 70–99)
HCT VFR BLD AUTO: 46.7 % (ref 40–53)
HGB BLD-MCNC: 15.1 G/DL (ref 13.3–17.7)
HOLD SPECIMEN: NORMAL
IMM GRANULOCYTES # BLD: 0 10E3/UL
IMM GRANULOCYTES NFR BLD: 1 %
LYMPHOCYTES # BLD AUTO: 1.4 10E3/UL (ref 0.8–5.3)
LYMPHOCYTES NFR BLD AUTO: 20 %
MCH RBC QN AUTO: 29.5 PG (ref 26.5–33)
MCHC RBC AUTO-ENTMCNC: 32.3 G/DL (ref 31.5–36.5)
MCV RBC AUTO: 91 FL (ref 78–100)
MONOCYTES # BLD AUTO: 0.4 10E3/UL (ref 0–1.3)
MONOCYTES NFR BLD AUTO: 6 %
NEUTROPHILS # BLD AUTO: 5 10E3/UL (ref 1.6–8.3)
NEUTROPHILS NFR BLD AUTO: 71 %
NRBC # BLD AUTO: 0 10E3/UL
NRBC BLD AUTO-RTO: 0 /100
OPIATES UR QL SCN: ABNORMAL
PCP UR QL SCN: ABNORMAL
PLATELET # BLD AUTO: 282 10E3/UL (ref 150–450)
POTASSIUM BLD-SCNC: 4.6 MMOL/L (ref 3.4–5.3)
RBC # BLD AUTO: 5.11 10E6/UL (ref 4.4–5.9)
SARS-COV-2 RNA RESP QL NAA+PROBE: NEGATIVE
SODIUM SERPL-SCNC: 140 MMOL/L (ref 133–144)
TROPONIN I SERPL-MCNC: <0.015 UG/L (ref 0–0.04)
WBC # BLD AUTO: 6.9 10E3/UL (ref 4–11)

## 2021-10-14 PROCEDURE — 84484 ASSAY OF TROPONIN QUANT: CPT | Performed by: PHYSICIAN ASSISTANT

## 2021-10-14 PROCEDURE — 85025 COMPLETE CBC W/AUTO DIFF WBC: CPT | Performed by: PHYSICIAN ASSISTANT

## 2021-10-14 PROCEDURE — 36415 COLL VENOUS BLD VENIPUNCTURE: CPT | Performed by: PHYSICIAN ASSISTANT

## 2021-10-14 PROCEDURE — 80048 BASIC METABOLIC PNL TOTAL CA: CPT | Performed by: PHYSICIAN ASSISTANT

## 2021-10-14 PROCEDURE — 93005 ELECTROCARDIOGRAM TRACING: CPT | Performed by: PHYSICIAN ASSISTANT

## 2021-10-14 PROCEDURE — 99284 EMERGENCY DEPT VISIT MOD MDM: CPT | Mod: 25 | Performed by: PHYSICIAN ASSISTANT

## 2021-10-14 PROCEDURE — 250N000013 HC RX MED GY IP 250 OP 250 PS 637: Performed by: PHYSICIAN ASSISTANT

## 2021-10-14 PROCEDURE — 71045 X-RAY EXAM CHEST 1 VIEW: CPT

## 2021-10-14 PROCEDURE — 93010 ELECTROCARDIOGRAM REPORT: CPT | Performed by: PHYSICIAN ASSISTANT

## 2021-10-14 PROCEDURE — 99285 EMERGENCY DEPT VISIT HI MDM: CPT | Mod: 25 | Performed by: PHYSICIAN ASSISTANT

## 2021-10-14 PROCEDURE — C9803 HOPD COVID-19 SPEC COLLECT: HCPCS | Performed by: PHYSICIAN ASSISTANT

## 2021-10-14 PROCEDURE — 80307 DRUG TEST PRSMV CHEM ANLYZR: CPT | Performed by: PHYSICIAN ASSISTANT

## 2021-10-14 PROCEDURE — 87635 SARS-COV-2 COVID-19 AMP PRB: CPT | Performed by: PHYSICIAN ASSISTANT

## 2021-10-14 RX ORDER — AZITHROMYCIN 250 MG/1
TABLET, FILM COATED ORAL
Qty: 6 TABLET | Refills: 0 | Status: SHIPPED | OUTPATIENT
Start: 2021-10-14 | End: 2021-10-19

## 2021-10-14 RX ORDER — OLANZAPINE 5 MG/1
5 TABLET, ORALLY DISINTEGRATING ORAL ONCE
Status: COMPLETED | OUTPATIENT
Start: 2021-10-14 | End: 2021-10-14

## 2021-10-14 RX ADMIN — OLANZAPINE 5 MG: 5 TABLET, ORALLY DISINTEGRATING ORAL at 15:48

## 2021-10-14 ASSESSMENT — MIFFLIN-ST. JEOR: SCORE: 2096.16

## 2021-10-14 NOTE — ED PROVIDER NOTES
"  History     Chief Complaint   Patient presents with     Shortness of Breath     HPI  Rafy Zepeda is a 31 year old male past medical history significant for ADD, generalized anxiety disorder, history of meningitis as a child who states he is is not currently taking any of his previously medications who presents to the ER with concern over not feeling well for the last week.  Upon my entering the room patient states that wearing mask is causing him to have a panic attack and is requesting something for panic.  He states that he has had \"electric sensation\" in his head \"like he is in withdrawal from something\" but denies any potential substances he could be withdrawaling from.  He also complains of nasal congestion, mild cough, shortness of breath, diarrhea and states that he has multiple sick contacts that he is currently living with, that \"they all have low testosterone\".  He denies any actual headache.  He dies any dizziness, lightheadedness, loss of taste or smell, chest pains, abdominal pain.  He has not attempted any OTC treatments.  He initially stated he is not taking any of his home medications however states last took his ADHD medication three days ago.  He also admits to marijuana use.  Denies any other drug use.  Denies any hallucinations, suicidal ideation.      Allergies:  Allergies   Allergen Reactions     Acetaminophen Nausea and Vomiting and Nausea     Ibuprofen Nausea     Sulfamethoxazole-Trimethoprim GI Disturbance     Other reaction(s): Gastrointestinal  Vomiting, nausea  Vomiting, nausea       Soap Rash     Unsure what kind of soap at this time     Problem List:    Patient Active Problem List    Diagnosis Date Noted     Head injury, closed, with LOC of unknown duration (H) 02/28/2016     Priority: Medium     ADD (attention deficit disorder) 11/05/2015     Priority: Medium     November 6, 2015  URINE TOX SCREEN NOT AS EXPECTED NO FURTHER ADDERALL THROUGH THIS OFFICE - [facility]   "     Generalized anxiety disorder 06/02/2014     Priority: Medium     Diagnosis updated by automated process. Provider to review and confirm.       CARDIOVASCULAR SCREENING; LDL GOAL LESS THAN 160 06/21/2012     Priority: Medium      Past Medical History:    Past Medical History:   Diagnosis Date     Meningitis, unspecified(322.9) 10/1996     Past Surgical History:    Past Surgical History:   Procedure Laterality Date     HAND SURGERY Right 05/07/2018     NOSE SURGERY       Family History:    Family History   Problem Relation Age of Onset     Diabetes Maternal Grandmother      Hypertension Maternal Grandmother      Cancer Paternal Grandmother         lung     Hypertension Paternal Grandfather      Anxiety Disorder Mother      Asthma Mother      Allergies Mother      Anxiety Disorder Sister      Social History:  Marital Status:  Single [1]  Social History     Tobacco Use     Smoking status: Current Some Day Smoker     Packs/day: 0.50     Types: Cigarettes     Smokeless tobacco: Former User     Types: Chew     Quit date: 11/1/2011     Tobacco comment: 1 pack per week   Substance Use Topics     Alcohol use: Yes     Drug use: No      Medications:    amphetamine-dextroamphetamine (ADDERALL) 30 MG tablet  HYDROcodone-acetaminophen (NORCO) 5-325 MG tablet  meclizine (ANTIVERT) 25 MG tablet  oxyCODONE (ROXICODONE) 5 MG tablet  venlafaxine (EFFEXOR-XR) 37.5 MG 24 hr capsule      Review of Systems   Constitutional: Positive for chills and fatigue. Negative for fever.   HENT: Positive for congestion. Negative for ear discharge and sore throat.    Eyes: Negative for photophobia and visual disturbance.   Respiratory: Positive for cough and shortness of breath.    Cardiovascular: Negative for chest pain and palpitations.   Gastrointestinal: Positive for diarrhea and nausea. Negative for abdominal pain and vomiting.   Skin: Negative for color change, rash and wound.   Neurological: Negative for dizziness, seizures, syncope, speech  "difficulty, weakness, light-headedness and headaches.   Psychiatric/Behavioral: Negative for hallucinations, self-injury and suicidal ideas.     Physical Exam   BP: 124/70  Pulse: 116  Temp: 97  F (36.1  C)  Resp: 18  Height: 198.1 cm (6' 6\")  Weight: 99.8 kg (220 lb)  SpO2: 94 %  Physical Exam  GENERAL APPEARANCE: alert, patient does appear anxious, rocking back and forth on bed, attempting to take mask off but is redirectable, cooperative  EYES: EOMI,  PERRL, conjunctiva clear  HENT: ear canals and TM's normal.  Posterior pharynx non-erythematous without exudate   NECK: supple, nontender, no lymphadenopathy  RESP: lungs clear to auscultation - no rales, rhonchi or wheezes  CV: tachycardia, regular rhythm, normal S1 S2, no murmur noted  ABDOMEN:  soft, nontender, no HSM or masses and bowel sounds normal  NEURO: Normal strength and tone, sensory exam grossly normal,  normal speech and mentation  SKIN: no suspicious lesions or rashes  ED Course        Procedures            EKG Interpretation:      Interpreted by Shanice Fox PA-C  Time reviewed: 18:00  Symptoms at time of EKG: shortness of breath    Rhythm: normal sinus   Rate: 64  Axis: Normal  Ectopy: none  Conduction: normal  ST Segments/ T Waves: No acute ischemic changes  Q Waves: none  Comparison to prior: Unchanged from 9/7/15    Clinical Impression: no acute changes    Critical Care time:  none          Results for orders placed or performed during the hospital encounter of 10/14/21   XR Chest Port 1 View     Status: None    Narrative    XR CHEST PORT 1 VIEW  10/14/2021 4:59 PM       INDICATION: shortness of breath  COMPARISON: 1/20/2006       Impression    IMPRESSION: Focal opacity in the left midlung is new from previous.  This may represent pneumonia. Recommend follow-up to ensure clearing.  The right lung remains clear.    YANIRA TELLO MD         SYSTEM ID:  [id number]   Symptomatic COVID-19 Virus (Coronavirus) by PCR Nasopharyngeal     Status: Normal "    Specimen: Nasopharyngeal; Swab   Result Value Ref Range    SARS CoV2 PCR Negative Negative    Narrative    Testing was performed using the abdullahi  SARS-CoV-2 & Influenza A/B Assay on the [employer]dullahi  Skye  System.  This test should be ordered for the detection of SARS-COV-2 in individuals who meet SARS-CoV-2 clinical and/or epidemiological criteria. Test performance is unknown in asymptomatic patients.  This test is for in vitro diagnostic use under the FDA EUA for laboratories certified under CLIA to perform moderate and/or high complexity testing. This test has not been FDA cleared or approved.  A negative test does not rule out the presence of PCR inhibitors in the specimen or target RNA in concentration below the limit of detection for the assay. The possibility of a false negative should be considered if the patient's recent exposure or clinical presentation suggests COVID-19.  [facility] are certified under the Clinical Laboratory Improvement Amendments of 1988 (CLIA-88) as qualified to perform moderate and/or high complexity laboratory testing.   Basic metabolic panel     Status: Normal   Result Value Ref Range    Sodium 140 133 - 144 mmol/L    Potassium 4.6 3.4 - 5.3 mmol/L    Chloride 109 94 - 109 mmol/L    Carbon Dioxide (CO2) 28 20 - 32 mmol/L    Anion Gap 3 3 - 14 mmol/L    Urea Nitrogen 15 7 - 30 mg/dL    Creatinine 0.85 0.66 - 1.25 mg/dL    Calcium 8.9 8.5 - 10.1 mg/dL    Glucose 84 70 - 99 mg/dL    GFR Estimate >90 >60 mL/min/1.73m2   Troponin I     Status: Normal   Result Value Ref Range    Troponin I <0.015 0.000 - 0.045 ug/L   CBC with platelets and differential     Status: None   Result Value Ref Range    WBC Count 6.9 4.0 - 11.0 10e3/uL    RBC Count 5.11 4.40 - 5.90 10e6/uL    Hemoglobin 15.1 13.3 - 17.7 g/dL    Hematocrit 46.7 40.0 - 53.0 %    MCV 91 78 - 100 fL    MCH 29.5 26.5 - 33.0 pg    MCHC 32.3 31.5 - 36.5 g/dL    RDW 11.4 10.0 - 15.0 %    Platelet Count 282 150 - 450  10e3/uL    % Neutrophils 71 %    % Lymphocytes 20 %    % Monocytes 6 %    % Eosinophils 1 %    % Basophils 1 %    % Immature Granulocytes 1 %    NRBCs per 100 WBC 0 <1 /100    Absolute Neutrophils 5.0 1.6 - 8.3 10e3/uL    Absolute Lymphocytes 1.4 0.8 - 5.3 10e3/uL    Absolute Monocytes 0.4 0.0 - 1.3 10e3/uL    Absolute Eosinophils 0.1 0.0 - 0.7 10e3/uL    Absolute Basophils 0.0 0.0 - 0.2 10e3/uL    Absolute Immature Granulocytes 0.0 <=0.0 10e3/uL    Absolute NRBCs 0.0 10e3/uL   Drug abuse screen 77 urine (FL, RH, SH)     Status: Abnormal   Result Value Ref Range    Amphetamines Urine Screen Positive (A) Screen Negative    Barbiturates Urine Screen Negative Screen Negative    Benzodiazepines Urine Screen Negative Screen Negative    Cannabinoids Urine Screen Positive (A) Screen Negative    Cocaine Urine Screen Negative Screen Negative    Opiates Urine Screen Negative Screen Negative    PCP Urine Screen Negative Screen Negative     Medications   OLANZapine zydis (zyPREXA) ODT tab 5 mg (5 mg Oral Given 10/14/21 4520)     Assessments & Plan (with Medical Decision Making)     I have reviewed the nursing notes.  I have reviewed the findings, diagnosis, plan and need for follow up with the patient.     Discharge Medication List as of 10/14/2021  5:57 PM      START taking these medications    Details   amoxicillin-clavulanate (AUGMENTIN) 875-125 MG tablet Take 1 tablet by mouth 2 times daily for 10 days, Disp-20 tablet, R-0, E-Prescribe      azithromycin (ZITHROMAX Z-NORIS) 250 MG tablet Two tablets on the first day, then one tablet daily for the next 4 days, Disp-6 tablet, R-0, E-Prescribe           Final diagnoses:   Pneumonia     31-year-old male presents to the emergency department with concern over 5-day history of nasal congestion, cough, shortness of breath, diarrhea.  He was noted to be tachycardic upon arrival however did appear outwardly anxious and heart rate did normalize throughout exam.  Physical exam findings  included lungs which did not have any significant, crackles or wheezing.  He had testing for COVID-19 which was ultimately negative.  CBC, BMP, troponin were non-contributory.  He did have Chest x-ray which showed focal opacity in the left midlung which was new from prior may represent pneumonia per radiology report.  Urine drug screen significant for patient admitted opioids and amphetamines which may be secondary to his ADHD medications.  I have low suspicion for pulmonary embolism at this time and will defer further evaluation.  Patient was treated with given single dose of oral Zyprexa in the department for his agitation and did calm down was restful.  He sates he is amenable to going home at this time.  He was discharged home stable with prescription for Augmentin, Zithromax.  Follow-up for recheck within the next week.  Worrisome reasons to return to the ER sooner discussed.     After patient was discharged from the department and was contacted by triage nurse that patient's mother wanted to speak to me.  I was not able to immediately meet her in triage as was currently in a procedure.  She left phone number and asked to be contacted.  I did contact her by phone but told her I could not speak to her regarding patient's evaluation and care without his consent and she did not endorse that she was currently with him for him to provide consent.  She then preceded to tell me about concerns that she has in patient's care today and concerns that he has endorsed suicidal ideation over the last several days.  I attempted to address her concerns but she immediately hung up on me before I could speak.      Disclaimer: This note consists of symbols derived from keyboarding, dictation, and/or voice recognition software. As a result, there may be errors in the script that have gone undetected.  Please consider this when interpreting information found in the chart.    10/14/2021   [facility] EMERGENCY DEPT      Shanice Fox, PA-C  10/21/21 1202

## 2021-10-14 NOTE — ED NOTES
"Pt arrives via triage. Pt states he has \"not been feeling good\" for the last week and a half. Pt feels like his head is being \"shocked\". He states it feels similar to when he stopped taking effexor but he denies having taken it in over a year. He also states that he has felt lethargic and anxious. Pt denies any alcohol use and drug use besides marijuana.  "

## 2021-10-21 ASSESSMENT — ENCOUNTER SYMPTOMS
DIARRHEA: 1
SPEECH DIFFICULTY: 0
COLOR CHANGE: 0
PALPITATIONS: 0
VOMITING: 0
LIGHT-HEADEDNESS: 0
SHORTNESS OF BREATH: 1
ABDOMINAL PAIN: 0
SORE THROAT: 0
WOUND: 0
FATIGUE: 1
DIZZINESS: 0
HALLUCINATIONS: 0
PHOTOPHOBIA: 0
SEIZURES: 0
COUGH: 1
HEADACHES: 0
NAUSEA: 1
CHILLS: 1
FEVER: 0
WEAKNESS: 0

## 2023-09-11 ENCOUNTER — APPOINTMENT (OUTPATIENT)
Dept: RADIOLOGY | Facility: CLINIC | Age: 33
End: 2023-09-11
Attending: EMERGENCY MEDICINE

## 2023-09-11 ENCOUNTER — HOSPITAL ENCOUNTER (EMERGENCY)
Facility: CLINIC | Age: 33
Discharge: HOME | End: 2023-09-11

## 2023-09-11 VITALS
HEART RATE: 75 BPM | WEIGHT: 265 LBS | HEIGHT: 77 IN | DIASTOLIC BLOOD PRESSURE: 84 MMHG | TEMPERATURE: 97.6 F | RESPIRATION RATE: 18 BRPM | SYSTOLIC BLOOD PRESSURE: 136 MMHG | BODY MASS INDEX: 31.29 KG/M2 | OXYGEN SATURATION: 98 %

## 2023-09-11 DIAGNOSIS — S90.31XA CONTUSION OF RIGHT FOOT, INITIAL ENCOUNTER: ICD-10-CM

## 2023-09-11 DIAGNOSIS — L03.115 CELLULITIS OF RIGHT FOOT: ICD-10-CM

## 2023-09-11 PROCEDURE — 99284 EMERGENCY DEPT VISIT MOD MDM: CPT | Mod: 25

## 2023-09-11 PROCEDURE — 250N000011 HC RX IP 250 OP 636

## 2023-09-11 PROCEDURE — 250N000013 HC RX MED GY IP 250 OP 250 PS 637

## 2023-09-11 PROCEDURE — 99284 EMERGENCY DEPT VISIT MOD MDM: CPT

## 2023-09-11 PROCEDURE — 73630 X-RAY EXAM OF FOOT: CPT | Mod: RT

## 2023-09-11 RX ORDER — ONDANSETRON 4 MG/1
4 TABLET, ORALLY DISINTEGRATING ORAL ONCE
Status: COMPLETED | OUTPATIENT
Start: 2023-09-11 | End: 2023-09-11

## 2023-09-11 RX ORDER — CEPHALEXIN 500 MG/1
500 CAPSULE ORAL 4 TIMES DAILY
Qty: 28 CAPSULE | Refills: 0 | Status: SHIPPED | OUTPATIENT
Start: 2023-09-11 | End: 2023-09-18

## 2023-09-11 RX ORDER — OXYCODONE HYDROCHLORIDE 5 MG/1
5 TABLET ORAL ONCE
Status: COMPLETED | OUTPATIENT
Start: 2023-09-11 | End: 2023-09-11

## 2023-09-11 RX ORDER — OXYCODONE HYDROCHLORIDE 5 MG/1
5 TABLET ORAL EVERY 6 HOURS PRN
Qty: 6 TABLET | Refills: 0 | Status: SHIPPED | OUTPATIENT
Start: 2023-09-11 | End: 2023-09-14

## 2023-09-11 RX ORDER — DOXYCYCLINE 100 MG/1
100 CAPSULE ORAL 2 TIMES DAILY
Qty: 14 CAPSULE | Refills: 0 | Status: SHIPPED | OUTPATIENT
Start: 2023-09-11 | End: 2023-09-18

## 2023-09-11 RX ADMIN — ONDANSETRON 4 MG: 4 TABLET, ORALLY DISINTEGRATING ORAL at 16:39

## 2023-09-11 RX ADMIN — OXYCODONE HYDROCHLORIDE 5 MG: 5 TABLET ORAL at 16:39

## 2023-09-11 ASSESSMENT — ENCOUNTER SYMPTOMS
DIARRHEA: 0
ABDOMINAL PAIN: 1
SORE THROAT: 0
FEVER: 1
ARTHRALGIAS: 1
NAUSEA: 1
COUGH: 0
RHINORRHEA: 0
HEADACHES: 1
BRUISES/BLEEDS EASILY: 1
MYALGIAS: 1
VOMITING: 0

## 2023-09-11 NOTE — Clinical Note
Rafy Zepeda was seen and treated in our emergency department on 9/11/2023.  He may return to work on 09/12/2023.       If you have any questions or concerns, please don't hesitate to call.      Janine Delgadillo PA-C

## 2023-09-11 NOTE — ED PROVIDER NOTES
EMERGENCY DEPARTMENT ENCOUNTER      NAME: Rafy Zepeda  AGE: 33 year old male  YOB: 1990  MRN: [id number]  EVALUATION DATE & TIME: No admission date for patient encounter.    PCP: Mark Burgess    ED PROVIDER: Janine Delgadillo PA-C      Chief Complaint   Patient presents with    Foot Injury         FINAL IMPRESSION:  1. Contusion of right foot, initial encounter    2. Cellulitis of right foot          ED COURSE & MEDICAL DECISION MAKIN:04 PM Met with patient for initial interview. Plan for care discussed.  4:45 PM Reevaluated and updated patient. I discussed the plan for discharge with the patient, and patient is agreeable. We discussed supportive cares at home and reasons for return to the ER including new or worsening symptoms. All questions and concerns addressed. Patient to be discharged by RN    Pertinent Labs & Imaging studies reviewed. (See chart for details)  33 year old male with a history of SONIDO, ADHD presents to the Emergency Department for evaluation of post-traumatic right foot pain after piece of plywood landed on dorsum of foot on Saturday. He developed fever this morning and noticed redness and swelling to dorsum of foot. Of note, he reports taking one dose of Amoxicillin yesterday, which he had from an old prescription from his mother. Upon exam, patient is afebrile, hemodynamically stable, but appears uncomfortable. Scattered ecchymosis, diffuse edema, erythema, and mild warmth to dorsum of foot as pictured below. Tenderness to palpation over dorsal mid-foot. No associated purulence, fluctuance, open wounds visualized, or palpable crepitus. Neurovascularly intact distally. Differential diagnosis includes but not limited to fracture, dislocation, contusion, strain/sprain, cellulitis. No calf swelling/tenderness or red flags for DVT; therefore, US deferred. Based on patient's presenting symptoms, imaging was ordered. XR revealed soft tissue swelling, but no acute  fracture.    Patient was treated with oxycodone and zofran upon arrival with moderate improvement in symptoms. Symptoms and workup most consistent with contusion and early cellulitis. Patient was made aware of the above findings. Discussed option of additional baseline laboratories/inflammatory markers including CBC and CRP in addition to abdominal pain work-up, which patient ultimately defers. Plan to discharge patient home with prescription Keflex, Doxycycline, and oxycodone. Discussed strict return precautions and close follow up with their PCP for reevaluation and ongoing management in 1-2 days. The patient was stable and well appearing upon discharge. The patient was advised to return to the ER if any new or worsening symptoms develop. The patient verbalizes understanding and agrees with the plan.     Medical Decision Making    History:  Supplemental history from: Documented in chart, if applicable  External Record(s) reviewed: Documented in chart, if applicable.    Work Up:  Chart documentation includes differential considered and any EKGs or imaging independently interpreted by provider, where specified.  In additional to work up documented, I considered the following work up: Documented in chart, if applicable.    External consultation:  Discussion of management with another provider: Documented in chart, if applicable    Complicating factors:  Care impacted by chronic illness: N/A  Care affected by social determinants of health: N/A    Disposition considerations: Discharge. I prescribed additional prescription strength medication(s) as charted. See documentation for any additional details.        MEDICATIONS GIVEN IN THE EMERGENCY:  Medications   oxyCODONE (ROXICODONE) tablet 5 mg (5 mg Oral $Given 9/11/23 1639)   ondansetron (ZOFRAN ODT) ODT tab 4 mg (4 mg Oral $Given 9/11/23 1639)       NEW PRESCRIPTIONS STARTED AT TODAY'S ER VISIT  Discharge Medication List as of 9/11/2023  5:01 PM        START taking  these medications    Details   cephALEXin (KEFLEX) 500 MG capsule Take 1 capsule (500 mg) by mouth 4 times daily for 7 days, Disp-28 capsule, R-0, Local Print      doxycycline hyclate (VIBRAMYCIN) 100 MG capsule Take 1 capsule (100 mg) by mouth 2 times daily for 7 days, Disp-14 capsule, R-0, Local Print      !! oxyCODONE (ROXICODONE) 5 MG tablet Take 1 tablet (5 mg) by mouth every 6 hours as needed for severe pain, Disp-6 tablet, R-0, Local Print       !! - Potential duplicate medications found. Please discuss with provider.             =================================================================    HPI    Patient information was obtained from: Patient    Use of : N/A       Rafy Zepeda is a 33 year old male with a pertinent history of ADD, SONIDO, who presents to this ED by private car for evaluation of a foot injury.    Patient reports an onset of a right foot injury with pain and swelling which occured on 9/9 (~2 days prior), after landing his right foot on a plywood. His swelling now radiates to his right calve. He now endorses a minor headache, generalized body aches, nausea, abdominal pain, and bruising. Patient reports he started having a fever of 100.2 degrees last night and took a pill of amoxicillin, from leftover medication. He woke up this morning with a fever of 102.2 degrees and took ibuprofen 4-5 hours ago, prior to arrival in the ED. Patient last ate and drank at 11:00 AM this morning. He has never experienced these symptoms before. He denies runny nose, sore throat, coughing, vomiting, diarrhea, and visual changes.    Patient doesn't have a history of diabetes or uses tobacco. No other medical concerns.     REVIEW OF SYSTEMS   Review of Systems   Constitutional:  Positive for fever.   HENT:  Negative for rhinorrhea and sore throat.    Eyes:  Negative for visual disturbance.   Respiratory:  Negative for cough.    Gastrointestinal:  Positive for abdominal pain and nausea. Negative for  diarrhea and vomiting.   Musculoskeletal:  Positive for arthralgias (right foot injury with swelling to right calve) and myalgias.   Neurological:  Positive for headaches.   Hematological:  Bruises/bleeds easily (right foot injury).   All other systems reviewed and are negative.       PAST MEDICAL HISTORY:  Past Medical History:   Diagnosis Date    Meningitis, unspecified(322.9) 10/1996       PAST SURGICAL HISTORY:  Past Surgical History:   Procedure Laterality Date    HAND SURGERY Right 05/07/2018    NOSE SURGERY             CURRENT MEDICATIONS:    cephALEXin (KEFLEX) 500 MG capsule  doxycycline hyclate (VIBRAMYCIN) 100 MG capsule  oxyCODONE (ROXICODONE) 5 MG tablet  amphetamine-dextroamphetamine (ADDERALL) 30 MG tablet  HYDROcodone-acetaminophen (NORCO) 5-325 MG tablet  meclizine (ANTIVERT) 25 MG tablet  oxyCODONE (ROXICODONE) 5 MG tablet  venlafaxine (EFFEXOR-XR) 37.5 MG 24 hr capsule        ALLERGIES:  Allergies   Allergen Reactions    Acetaminophen Nausea and Vomiting and Nausea    Ibuprofen Nausea    Sulfamethoxazole-Trimethoprim GI Disturbance     Other reaction(s): Gastrointestinal  Vomiting, nausea  Vomiting, nausea      Soap Rash     Unsure what kind of soap at this time       FAMILY HISTORY:  Family History   Problem Relation Age of Onset    Diabetes Maternal Grandmother     Hypertension Maternal Grandmother     Cancer Paternal Grandmother         lung    Hypertension Paternal Grandfather     Anxiety Disorder Mother     Asthma Mother     Allergies Mother     Anxiety Disorder Sister        SOCIAL HISTORY:   Social History     Socioeconomic History    Marital status: Single   Occupational History    Occupation:    Tobacco Use    Smoking status: Some Days     Packs/day: 0.50     Types: Cigarettes    Smokeless tobacco: Former     Types: Chew     Quit date: 11/1/2011    Tobacco comments:     1 pack per week   Substance and Sexual Activity    Alcohol use: Yes    Drug use: No    Sexual activity: Not  "Currently     Partners: Female     Birth control/protection: Condom   Other Topics Concern    Parent/sibling w/ CABG, MI or angioplasty before 65F 55M? No   Social History Narrative    January 11, 2019    ENVIRONMENTAL HISTORY: The family lives in a 22 year old home, [place] The home is heated with a forced air. They do have central air conditioning. The patient's bedroom is furnished with feather/wool bedding or pillows and carpeting in bedroom.  Pets inside the house include 3 dogs. 2 cats that are outside only. There is no history of cockroach or mice infestation. There are 4 smokers in the house.  The house does not have a damp basement.        VITALS:  /84 (BP Location: Right arm, Patient Position: Chair)   Pulse 75   Temp 97.6  F (36.4  C) (Oral)   Resp 18   Ht 1.956 m (6' 5\")   Wt 120.2 kg (265 lb)   SpO2 98%   BMI 31.42 kg/m      PHYSICAL EXAM    Constitutional:  Alert, appears uncomfortable, but in no acute distress. Cooperative.  EYES: Conjunctivae clear.  HENT:  Atraumatic, normocephalic.  Respiratory:  Respirations even, unlabored, in no acute respiratory distress.  Cardiovascular:  Regular rate, good peripheral perfusion.  GI: Soft, flat, non-distended.  Musculoskeletal: Right lower extremity: Scattered ecchymosis, diffuse edema, erythema, and mild warmth to dorsum of foot as pictured below. Tenderness to palpation over dorsal mid-foot. No associated purulence, fluctuance, open wounds visualized, or palpable crepitus. ROM full but with some pain to dorsal aspect of foot. Neurovascularly intact distally. Cap refill <2 seconds. 2+ posterior tibialis pulse. 5/5 strength. Compartments soft. No calf swelling or tenderness to palpation.   Integument: Warm, Dry.   Neurologic:  Alert & oriented. No focal deficits noted.  Psych: Normal mood and affect.      LAB:  All pertinent labs reviewed and interpreted.  Results for orders placed or performed " during the hospital encounter of 09/11/23   Foot  XR, G/E 3 views, right    Impression    IMPRESSION: Moderate hallux valgus and mild degenerative arthritis of the first metatarsophalangeal joint. Otherwise, normal joint spaces and alignment. No acute fracture. Soft tissue edema about the forefoot.       RADIOLOGY:  Reviewed all pertinent imaging. Please see official radiology report.  Foot  XR, G/E 3 views, right   Final Result   IMPRESSION: Moderate hallux valgus and mild degenerative arthritis of the first metatarsophalangeal joint. Otherwise, normal joint spaces and alignment. No acute fracture. Soft tissue edema about the forefoot.             I, Phill Cotter, am serving as a scribe to document services personally performed by Janine Delgadillo PA-C based on my observation and the provider's statements to me. I, Janine Delgadillo PA-C, attest that Phill Cotter is acting in a scribe capacity, has observed my performance of the services and has documented them in accordance with my direction.    Janine Delgadillo PA-C  [facility] EMERGENCY ROOM  [address] [id number]  [phone]      Janine Delgadillo PA-C  09/11/23 4837

## 2023-09-11 NOTE — ED TRIAGE NOTES
Patient arrive sto the ER with c/o right foot pain. This past Saturday a large piece of plywood landed on his right foot. Patient started experiencing fevers at home yesterday. Tmax 102.2  Last took ibuprofen 4 hours ago. Pain 8/10     Triage Assessment       Row Name 09/11/23 1519       Triage Assessment (Adult)    Airway WDL WDL       Respiratory WDL    Respiratory WDL WDL       Skin Circulation/Temperature WDL    Skin Circulation/Temperature WDL WDL;X  redness and swelling to R foot       Cardiac WDL    Cardiac WDL WDL       Peripheral/Neurovascular WDL    Peripheral Neurovascular WDL WDL       Cognitive/Neuro/Behavioral WDL    Cognitive/Neuro/Behavioral WDL WDL

## 2023-09-11 NOTE — DISCHARGE INSTRUCTIONS
You were seen in the ER for right foot pain. Your XR showed no acute fracture.    You were prescribed two antibiotics, Doxycycline and Keflex, please take these as directed.    Rest, ice, compression with ACE wrap, post-op shoe, elevate your extremity, and only increase activity as tolerates. You may use ibuprofen for swelling/pain and Tylenol as needed for pain/fevers. You may take oxycodone as needed for severe pain - do NOT drive on this medication as it can cause drowsiness. Additionally, please take a stool softener as this medication can cause constipation. This medication can be addicting, please limit your use and discard any remaining tablets.     Tylenol (Acetaminophen) Discharge Instructions:  You may take 2 tablets of regular strength, over-the-counter, Tylenol (acetaminophen) every 4-6 hours as needed for pain.  Take no more than 4000 mg of Tylenol in a 24-hour period.      Avoid taking more than 1 acetaminophen-containing product at a time and be aware that many over-the-counter medications contain a combination of acetaminophen and other products.  If you are taking Tylenol in addition to a combination product please keep track of your daily acetaminophen dose to make sure you do not exceed the recommended 4000 mg.  Taking too much acetaminophen can cause permanent damage to your liver.    Ibuprofen/Naproxen Discharge Instructions:  You may take ibuprofen for pain control.  The maximum dose of (ibuprofen is 3200 mg ) in a 24-hour period.    Take this medication with food to prevent stomach irritation.  With long-term use this medication can irritate the stomach causing pain and lead to development of a stomach ulcer.  If you notice stomach pain or vomiting of coffee-ground colored vomit or blood, please be seen by a healthcare provider.  Attempt to use this medication for the shortest time possible.      Follow-up with your primary care provider in 1-2 days for reevaluation ongoing  management.    Return to the emergency department for any new or worsening symptoms including increased pain, redness/warmth/drainage/swelling, fever/chills, new weakness, numbness/tingling, decreased range of motion, cold extremity, or any other concerning symptoms.     Take Care!  - Janine Delgadillo PA-C

## 2023-09-11 NOTE — ED NOTES
Patient discharged home with family driving. Discharge AVS and medications discussed with patient. Post-op shoe applied prior to patient leaving. All questions answered.